# Patient Record
Sex: MALE | Race: BLACK OR AFRICAN AMERICAN | NOT HISPANIC OR LATINO | URBAN - METROPOLITAN AREA
[De-identification: names, ages, dates, MRNs, and addresses within clinical notes are randomized per-mention and may not be internally consistent; named-entity substitution may affect disease eponyms.]

---

## 2018-07-15 VITALS
HEIGHT: 73 IN | RESPIRATION RATE: 20 BRPM | HEART RATE: 89 BPM | TEMPERATURE: 98 F | OXYGEN SATURATION: 97 % | WEIGHT: 191.14 LBS | DIASTOLIC BLOOD PRESSURE: 61 MMHG | SYSTOLIC BLOOD PRESSURE: 113 MMHG

## 2018-07-15 LAB
ALBUMIN SERPL ELPH-MCNC: 4 G/DL — SIGNIFICANT CHANGE UP (ref 3.3–5)
ALP SERPL-CCNC: 41 U/L — SIGNIFICANT CHANGE UP (ref 40–120)
ALT FLD-CCNC: 35 U/L — SIGNIFICANT CHANGE UP (ref 10–45)
ANION GAP SERPL CALC-SCNC: 12 MMOL/L — SIGNIFICANT CHANGE UP (ref 5–17)
APTT BLD: 29.9 SEC — SIGNIFICANT CHANGE UP (ref 27.5–37.4)
AST SERPL-CCNC: 21 U/L — SIGNIFICANT CHANGE UP (ref 10–40)
BASOPHILS NFR BLD AUTO: 0.3 % — SIGNIFICANT CHANGE UP (ref 0–2)
BILIRUB SERPL-MCNC: 0.6 MG/DL — SIGNIFICANT CHANGE UP (ref 0.2–1.2)
BLD GP AB SCN SERPL QL: NEGATIVE — SIGNIFICANT CHANGE UP
BUN SERPL-MCNC: 18 MG/DL — SIGNIFICANT CHANGE UP (ref 7–23)
CALCIUM SERPL-MCNC: 9 MG/DL — SIGNIFICANT CHANGE UP (ref 8.4–10.5)
CHLORIDE SERPL-SCNC: 103 MMOL/L — SIGNIFICANT CHANGE UP (ref 96–108)
CO2 SERPL-SCNC: 32 MMOL/L — HIGH (ref 22–31)
CREAT SERPL-MCNC: 1.31 MG/DL — HIGH (ref 0.5–1.3)
EOSINOPHIL NFR BLD AUTO: 0.5 % — SIGNIFICANT CHANGE UP (ref 0–6)
GLUCOSE SERPL-MCNC: 92 MG/DL — SIGNIFICANT CHANGE UP (ref 70–99)
HCT VFR BLD CALC: 38.2 % — LOW (ref 39–50)
HGB BLD-MCNC: 12.2 G/DL — LOW (ref 13–17)
INR BLD: 1.05 — SIGNIFICANT CHANGE UP (ref 0.88–1.16)
LYMPHOCYTES # BLD AUTO: 12.5 % — LOW (ref 13–44)
MCHC RBC-ENTMCNC: 29.2 PG — SIGNIFICANT CHANGE UP (ref 27–34)
MCHC RBC-ENTMCNC: 31.9 G/DL — LOW (ref 32–36)
MCV RBC AUTO: 91.4 FL — SIGNIFICANT CHANGE UP (ref 80–100)
MONOCYTES NFR BLD AUTO: 11.4 % — SIGNIFICANT CHANGE UP (ref 2–14)
NEUTROPHILS NFR BLD AUTO: 75.3 % — SIGNIFICANT CHANGE UP (ref 43–77)
OB PNL STL: POSITIVE
PLATELET # BLD AUTO: 199 K/UL — SIGNIFICANT CHANGE UP (ref 150–400)
POTASSIUM SERPL-MCNC: 3.2 MMOL/L — LOW (ref 3.5–5.3)
POTASSIUM SERPL-SCNC: 3.2 MMOL/L — LOW (ref 3.5–5.3)
PROT SERPL-MCNC: 5.8 G/DL — LOW (ref 6–8.3)
PROTHROM AB SERPL-ACNC: 11.7 SEC — SIGNIFICANT CHANGE UP (ref 9.8–12.7)
RBC # BLD: 4.18 M/UL — LOW (ref 4.2–5.8)
RBC # FLD: 15.3 % — SIGNIFICANT CHANGE UP (ref 10.3–16.9)
RH IG SCN BLD-IMP: POSITIVE — SIGNIFICANT CHANGE UP
SODIUM SERPL-SCNC: 147 MMOL/L — HIGH (ref 135–145)
WBC # BLD: 6.6 K/UL — SIGNIFICANT CHANGE UP (ref 3.8–10.5)
WBC # FLD AUTO: 6.6 K/UL — SIGNIFICANT CHANGE UP (ref 3.8–10.5)

## 2018-07-15 PROCEDURE — 74174 CTA ABD&PLVS W/CONTRAST: CPT | Mod: 26

## 2018-07-15 RX ORDER — PANTOPRAZOLE SODIUM 20 MG/1
80 TABLET, DELAYED RELEASE ORAL ONCE
Qty: 0 | Refills: 0 | Status: COMPLETED | OUTPATIENT
Start: 2018-07-15 | End: 2018-07-15

## 2018-07-15 RX ORDER — SODIUM CHLORIDE 9 MG/ML
1000 INJECTION INTRAMUSCULAR; INTRAVENOUS; SUBCUTANEOUS ONCE
Qty: 0 | Refills: 0 | Status: COMPLETED | OUTPATIENT
Start: 2018-07-15 | End: 2018-07-15

## 2018-07-15 RX ADMIN — SODIUM CHLORIDE 1000 MILLILITER(S): 9 INJECTION INTRAMUSCULAR; INTRAVENOUS; SUBCUTANEOUS at 19:04

## 2018-07-15 RX ADMIN — PANTOPRAZOLE SODIUM 80 MILLIGRAM(S): 20 TABLET, DELAYED RELEASE ORAL at 18:58

## 2018-07-15 NOTE — ED PROVIDER NOTE - OBJECTIVE STATEMENT
73M with hx of sz, diverticulosis presenting with rectal bleeding 2 episodes yesterday. No abdominal pain, no hematemesis, no hx of abd surg. Pt with 4-5 episodes of rectal bleeding in last 3 days.

## 2018-07-15 NOTE — ED ADULT TRIAGE NOTE - CHIEF COMPLAINT QUOTE
Patient c/o rectal bleeding ( dark red ) since friday . Denies any abdominal pain , nausea nor vomiting . Not on thinner .

## 2018-07-15 NOTE — ED PROVIDER NOTE - PHYSICAL EXAMINATION
General: NAD, alert, conversant, comfortable-appearing  Head: ncat  Eyes: clear, pupils round  Throat: MMM, oropharynx clear  Neck: supple, no large masses, no meningismus  CV: RRR no murmurs  Resp: CTAB no w/c/r  Abd: nontender, no rebound/guarding  Rectal: gross blood per rectum  Back: no c-s ttp  Ext: no lower ext swelling  Neuro: alert and oriented x 3, strength equal in all 4 ext

## 2018-07-15 NOTE — ED ADULT NURSE NOTE - OBJECTIVE STATEMENT
Patient is a 74yo M, arrived to ED via walk-in, AAOx3, in NAD, vitals stable, complaining of two episodes of dark red bloodly stools since yesterday. Patient denies any CP, SOB, dizziness, blood thinners, abdominal pain, N/V/D, fevers, chills, cough or any other complaints at this time.  PIV placed, labs drawn and sent.  Will continue with plan of care.

## 2018-07-15 NOTE — ED PROVIDER NOTE - MEDICAL DECISION MAKING DETAILS
73M with rectal bleeding concern for diverticular bleed, hemodynamically stable, no abd ttp, Mild anemia, will obtain CTA to r/o rectal bleed. Pt given IV fluids. protonix for possible UGIB, doubt given no hematemesis, no elevated bun.

## 2018-07-15 NOTE — ED PROVIDER NOTE - PROGRESS NOTE DETAILS
discussed with radiology protocol for hematochezia, concern for diverticular bleed. given iv fluids for cr of 1.3. awaiting ct scan if neg for surgical cause of bleed will admit to gastroenterology given continued bleed CTA abdomen/pelvis; no diverticular bleed. Discussed with radiology resident who discussed with his attending.

## 2018-07-16 ENCOUNTER — INPATIENT (INPATIENT)
Facility: HOSPITAL | Age: 73
LOS: 3 days | Discharge: HOME | DRG: 378 | End: 2018-07-20
Attending: STUDENT IN AN ORGANIZED HEALTH CARE EDUCATION/TRAINING PROGRAM | Admitting: STUDENT IN AN ORGANIZED HEALTH CARE EDUCATION/TRAINING PROGRAM
Payer: MEDICARE

## 2018-07-16 DIAGNOSIS — I95.1 ORTHOSTATIC HYPOTENSION: ICD-10-CM

## 2018-07-16 DIAGNOSIS — E87.6 HYPOKALEMIA: ICD-10-CM

## 2018-07-16 DIAGNOSIS — D50.0 IRON DEFICIENCY ANEMIA SECONDARY TO BLOOD LOSS (CHRONIC): ICD-10-CM

## 2018-07-16 DIAGNOSIS — E87.0 HYPEROSMOLALITY AND HYPERNATREMIA: ICD-10-CM

## 2018-07-16 DIAGNOSIS — R56.9 UNSPECIFIED CONVULSIONS: ICD-10-CM

## 2018-07-16 DIAGNOSIS — N17.9 ACUTE KIDNEY FAILURE, UNSPECIFIED: ICD-10-CM

## 2018-07-16 DIAGNOSIS — Z29.9 ENCOUNTER FOR PROPHYLACTIC MEASURES, UNSPECIFIED: ICD-10-CM

## 2018-07-16 DIAGNOSIS — K92.2 GASTROINTESTINAL HEMORRHAGE, UNSPECIFIED: ICD-10-CM

## 2018-07-16 DIAGNOSIS — K57.90 DIVERTICULOSIS OF INTESTINE, PART UNSPECIFIED, WITHOUT PERFORATION OR ABSCESS WITHOUT BLEEDING: ICD-10-CM

## 2018-07-16 DIAGNOSIS — Z91.89 OTHER SPECIFIED PERSONAL RISK FACTORS, NOT ELSEWHERE CLASSIFIED: ICD-10-CM

## 2018-07-16 LAB
ANION GAP SERPL CALC-SCNC: 14 MMOL/L — SIGNIFICANT CHANGE UP (ref 5–17)
APPEARANCE UR: CLEAR — SIGNIFICANT CHANGE UP
BILIRUB UR-MCNC: NEGATIVE — SIGNIFICANT CHANGE UP
BLD GP AB SCN SERPL QL: NEGATIVE — SIGNIFICANT CHANGE UP
BUN SERPL-MCNC: 18 MG/DL — SIGNIFICANT CHANGE UP (ref 7–23)
CALCIUM SERPL-MCNC: 8.6 MG/DL — SIGNIFICANT CHANGE UP (ref 8.4–10.5)
CHLORIDE SERPL-SCNC: 105 MMOL/L — SIGNIFICANT CHANGE UP (ref 96–108)
CO2 SERPL-SCNC: 27 MMOL/L — SIGNIFICANT CHANGE UP (ref 22–31)
COLOR SPEC: YELLOW — SIGNIFICANT CHANGE UP
CREAT SERPL-MCNC: 1.12 MG/DL — SIGNIFICANT CHANGE UP (ref 0.5–1.3)
DIFF PNL FLD: NEGATIVE — SIGNIFICANT CHANGE UP
GLUCOSE SERPL-MCNC: 90 MG/DL — SIGNIFICANT CHANGE UP (ref 70–99)
GLUCOSE UR QL: NEGATIVE — SIGNIFICANT CHANGE UP
HCT VFR BLD CALC: 27.7 % — LOW (ref 39–50)
HCT VFR BLD CALC: 27.9 % — LOW (ref 39–50)
HCT VFR BLD CALC: 28.8 % — LOW (ref 39–50)
HCT VFR BLD CALC: 33.6 % — LOW (ref 39–50)
HCT VFR BLD CALC: 33.7 % — LOW (ref 39–50)
HGB BLD-MCNC: 10.6 G/DL — LOW (ref 13–17)
HGB BLD-MCNC: 10.6 G/DL — LOW (ref 13–17)
HGB BLD-MCNC: 8.7 G/DL — LOW (ref 13–17)
HGB BLD-MCNC: 8.8 G/DL — LOW (ref 13–17)
HGB BLD-MCNC: 9.1 G/DL — LOW (ref 13–17)
KETONES UR-MCNC: NEGATIVE — SIGNIFICANT CHANGE UP
LEUKOCYTE ESTERASE UR-ACNC: NEGATIVE — SIGNIFICANT CHANGE UP
MAGNESIUM SERPL-MCNC: 2 MG/DL — SIGNIFICANT CHANGE UP (ref 1.6–2.6)
MCHC RBC-ENTMCNC: 28.9 PG — SIGNIFICANT CHANGE UP (ref 27–34)
MCHC RBC-ENTMCNC: 29 PG — SIGNIFICANT CHANGE UP (ref 27–34)
MCHC RBC-ENTMCNC: 29.1 PG — SIGNIFICANT CHANGE UP (ref 27–34)
MCHC RBC-ENTMCNC: 31.4 G/DL — LOW (ref 32–36)
MCHC RBC-ENTMCNC: 31.5 G/DL — LOW (ref 32–36)
MCHC RBC-ENTMCNC: 31.6 G/DL — LOW (ref 32–36)
MCV RBC AUTO: 91.8 FL — SIGNIFICANT CHANGE UP (ref 80–100)
MCV RBC AUTO: 92 FL — SIGNIFICANT CHANGE UP (ref 80–100)
MCV RBC AUTO: 92 FL — SIGNIFICANT CHANGE UP (ref 80–100)
NITRITE UR-MCNC: NEGATIVE — SIGNIFICANT CHANGE UP
PH UR: 7.5 — SIGNIFICANT CHANGE UP (ref 5–8)
PLATELET # BLD AUTO: 150 K/UL — SIGNIFICANT CHANGE UP (ref 150–400)
PLATELET # BLD AUTO: 151 K/UL — SIGNIFICANT CHANGE UP (ref 150–400)
PLATELET # BLD AUTO: 167 K/UL — SIGNIFICANT CHANGE UP (ref 150–400)
PLATELET # BLD AUTO: 180 K/UL — SIGNIFICANT CHANGE UP (ref 150–400)
PLATELET # BLD AUTO: 190 K/UL — SIGNIFICANT CHANGE UP (ref 150–400)
POTASSIUM SERPL-MCNC: 3.4 MMOL/L — LOW (ref 3.5–5.3)
POTASSIUM SERPL-SCNC: 3.4 MMOL/L — LOW (ref 3.5–5.3)
PROT UR-MCNC: NEGATIVE MG/DL — SIGNIFICANT CHANGE UP
RBC # BLD: 3.01 M/UL — LOW (ref 4.2–5.8)
RBC # BLD: 3.04 M/UL — LOW (ref 4.2–5.8)
RBC # BLD: 3.13 M/UL — LOW (ref 4.2–5.8)
RBC # BLD: 3.66 M/UL — LOW (ref 4.2–5.8)
RBC # BLD: 3.67 M/UL — LOW (ref 4.2–5.8)
RBC # FLD: 14.9 % — SIGNIFICANT CHANGE UP (ref 10.3–16.9)
RBC # FLD: 15 % — SIGNIFICANT CHANGE UP (ref 10.3–16.9)
RBC # FLD: 15 % — SIGNIFICANT CHANGE UP (ref 10.3–16.9)
RBC # FLD: 15.3 % — SIGNIFICANT CHANGE UP (ref 10.3–16.9)
RBC # FLD: 15.5 % — SIGNIFICANT CHANGE UP (ref 10.3–16.9)
RH IG SCN BLD-IMP: POSITIVE — SIGNIFICANT CHANGE UP
SODIUM SERPL-SCNC: 146 MMOL/L — HIGH (ref 135–145)
SP GR SPEC: 1.01 — SIGNIFICANT CHANGE UP (ref 1–1.03)
UROBILINOGEN FLD QL: 0.2 E.U./DL — SIGNIFICANT CHANGE UP
WBC # BLD: 4.2 K/UL — SIGNIFICANT CHANGE UP (ref 3.8–10.5)
WBC # BLD: 4.9 K/UL — SIGNIFICANT CHANGE UP (ref 3.8–10.5)
WBC # BLD: 4.9 K/UL — SIGNIFICANT CHANGE UP (ref 3.8–10.5)
WBC # BLD: 5 K/UL — SIGNIFICANT CHANGE UP (ref 3.8–10.5)
WBC # BLD: 5.2 K/UL — SIGNIFICANT CHANGE UP (ref 3.8–10.5)
WBC # FLD AUTO: 4.2 K/UL — SIGNIFICANT CHANGE UP (ref 3.8–10.5)
WBC # FLD AUTO: 4.9 K/UL — SIGNIFICANT CHANGE UP (ref 3.8–10.5)
WBC # FLD AUTO: 4.9 K/UL — SIGNIFICANT CHANGE UP (ref 3.8–10.5)
WBC # FLD AUTO: 5 K/UL — SIGNIFICANT CHANGE UP (ref 3.8–10.5)
WBC # FLD AUTO: 5.2 K/UL — SIGNIFICANT CHANGE UP (ref 3.8–10.5)

## 2018-07-16 PROCEDURE — 99223 1ST HOSP IP/OBS HIGH 75: CPT | Mod: GC

## 2018-07-16 PROCEDURE — 12345: CPT | Mod: GC

## 2018-07-16 PROCEDURE — 99285 EMERGENCY DEPT VISIT HI MDM: CPT

## 2018-07-16 PROCEDURE — 99222 1ST HOSP IP/OBS MODERATE 55: CPT

## 2018-07-16 RX ORDER — ACETAMINOPHEN 500 MG
650 TABLET ORAL ONCE
Qty: 0 | Refills: 0 | Status: COMPLETED | OUTPATIENT
Start: 2018-07-16 | End: 2018-07-16

## 2018-07-16 RX ORDER — SOD SULF/SODIUM/NAHCO3/KCL/PEG
4000 SOLUTION, RECONSTITUTED, ORAL ORAL ONCE
Qty: 0 | Refills: 0 | Status: COMPLETED | OUTPATIENT
Start: 2018-07-16 | End: 2018-07-16

## 2018-07-16 RX ORDER — LEVETIRACETAM 250 MG/1
0 TABLET, FILM COATED ORAL
Qty: 0 | Refills: 0 | COMMUNITY

## 2018-07-16 RX ORDER — PANTOPRAZOLE SODIUM 20 MG/1
40 TABLET, DELAYED RELEASE ORAL
Qty: 0 | Refills: 0 | Status: DISCONTINUED | OUTPATIENT
Start: 2018-07-16 | End: 2018-07-19

## 2018-07-16 RX ORDER — POTASSIUM CHLORIDE 20 MEQ
40 PACKET (EA) ORAL ONCE
Qty: 0 | Refills: 0 | Status: COMPLETED | OUTPATIENT
Start: 2018-07-16 | End: 2018-07-16

## 2018-07-16 RX ORDER — SODIUM CHLORIDE 9 MG/ML
1000 INJECTION INTRAMUSCULAR; INTRAVENOUS; SUBCUTANEOUS ONCE
Qty: 0 | Refills: 0 | Status: COMPLETED | OUTPATIENT
Start: 2018-07-16 | End: 2018-07-16

## 2018-07-16 RX ORDER — SODIUM CHLORIDE 9 MG/ML
1000 INJECTION INTRAMUSCULAR; INTRAVENOUS; SUBCUTANEOUS
Qty: 0 | Refills: 0 | Status: DISCONTINUED | OUTPATIENT
Start: 2018-07-16 | End: 2018-07-18

## 2018-07-16 RX ORDER — LEVETIRACETAM 250 MG/1
1000 TABLET, FILM COATED ORAL
Qty: 0 | Refills: 0 | Status: DISCONTINUED | OUTPATIENT
Start: 2018-07-16 | End: 2018-07-20

## 2018-07-16 RX ADMIN — Medication 40 MILLIEQUIVALENT(S): at 11:32

## 2018-07-16 RX ADMIN — Medication 40 MILLIEQUIVALENT(S): at 04:26

## 2018-07-16 RX ADMIN — Medication 4000 MILLILITER(S): at 10:47

## 2018-07-16 RX ADMIN — PANTOPRAZOLE SODIUM 40 MILLIGRAM(S): 20 TABLET, DELAYED RELEASE ORAL at 07:37

## 2018-07-16 RX ADMIN — PANTOPRAZOLE SODIUM 40 MILLIGRAM(S): 20 TABLET, DELAYED RELEASE ORAL at 18:59

## 2018-07-16 RX ADMIN — SODIUM CHLORIDE 100 MILLILITER(S): 9 INJECTION INTRAMUSCULAR; INTRAVENOUS; SUBCUTANEOUS at 19:01

## 2018-07-16 RX ADMIN — LEVETIRACETAM 1000 MILLIGRAM(S): 250 TABLET, FILM COATED ORAL at 18:59

## 2018-07-16 RX ADMIN — SODIUM CHLORIDE 500 MILLILITER(S): 9 INJECTION INTRAMUSCULAR; INTRAVENOUS; SUBCUTANEOUS at 07:37

## 2018-07-16 RX ADMIN — LEVETIRACETAM 1000 MILLIGRAM(S): 250 TABLET, FILM COATED ORAL at 07:17

## 2018-07-16 NOTE — PROGRESS NOTE ADULT - PROBLEM SELECTOR PLAN 5
Patient with unknown baseline, obtain collateral  - F/u creatinine s/p 1L NS, may be prerenal in setting of GIB Patient with unknown baseline, obtain collateral  - F/u creatinine s/p 1L NS, may be prerenal in setting of GIB  - creatinine 1.12 (improving form 1.31 on admisison)  -c/w Cr trends

## 2018-07-16 NOTE — H&P ADULT - PROBLEM SELECTOR PLAN 4
- Patient reports 6 years of keppra usage due to one syncopal episode. Unclear etiology of syncope  - Obtain collateral, continue home med in interim Patient with unknown baseline, obtain collateral  - F/u creatinine s/p 1L NS, may be prerenal in setting of GIB - May represent cause of bleed  - Unlikely diverticulitis as patient with no fever or leukocytosis

## 2018-07-16 NOTE — H&P ADULT - PROBLEM SELECTOR PLAN 6
Ppx protonix, no SQH in setting of bleed    F: s/p 1L NS, 100cc/hr NS  E: replete K<4 Mg<2  N: NPO in setting of possible colonoscopy    Full code  Dispo RMF - Patient reports 6 years of keppra usage due to one syncopal episode. Unclear etiology of syncope  - Obtain collateral, continue home med in interim in setting of volume loss, on IVFs monitor BMP

## 2018-07-16 NOTE — H&P ADULT - PROBLEM SELECTOR PLAN 1
Patient with three episodes of bright red blood per rectum  - May be due to chronic steroid use vs alcohol abuse in past vs diverticulosis  - Rectal exam in ED positive for BRB  - Consult GI in AM  - F/u CBC and trend Hb  - No known cardiac history, Hb goal >8  - Type and screen  - Protonix 40mg IV BID pending likely colonoscopy Patient with three episodes of bright red blood per rectum  - May be due to chronic steroid use vs alcohol abuse in past vs diverticulosis. Obtain collateral on steroid use, patient reports due to joint pain but no history of OA or Rh arthritis  - Rectal exam in ED positive for BRB  - Consult GI in AM  - F/u CBC and trend Hb  - No known cardiac history, Hb goal >8  - Type and screen  - Protonix 40mg IV BID pending likely colonoscopy Patient with three episodes of bright red blood per rectum  - May be due to chronic steroid use vs alcohol abuse in past vs diverticulosis. Obtain collateral on steroid use, patient reports due to joint pain but no history of OA or Rh arthritis  - Rectal exam in ED positive for BRB  - Consult GI in AM; consult surgery  - F/u CBC and trend Hb  - No known cardiac history, Hb goal >8  - Type and screen  - Protonix 40mg IV BID pending likely colonoscopy

## 2018-07-16 NOTE — PROGRESS NOTE ADULT - PROBLEM SELECTOR PLAN 2
-may be the cause of current brbpr bleed  -will continue to monitor  -less likely diverticulitis as patient presented with no fever or leukocytosis

## 2018-07-16 NOTE — H&P ADULT - NSHPSOCIALHISTORY_GEN_ALL_CORE
Patient lives with his sister and nephew  Former smoker (20 pack year history)  Former alcohol abuse  No drug use

## 2018-07-16 NOTE — H&P ADULT - NSHPREVIEWOFSYSTEMS_GEN_ALL_CORE
REVIEW OF SYSTEMS:    CONSTITUTIONAL: No weakness, fevers or chills  EYES/ENT: No visual changes;  No vertigo or throat pain   NECK: No pain or stiffness  RESPIRATORY: No cough, wheezing, hemoptysis; No shortness of breath  CARDIOVASCULAR: No chest pain or palpitations  GASTROINTESTINAL: 3 episodes hematochezia as per HPI. No abdominal or epigastric pain. No nausea, vomiting, or hematemesis; No diarrhea or constipation. No melena.  GENITOURINARY: No dysuria, frequency or hematuria  NEUROLOGICAL: No numbness or weakness  SKIN: No itching, burning, rashes, or lesions   All other review of systems is negative unless indicated above.

## 2018-07-16 NOTE — CONSULT NOTE ADULT - SUBJECTIVE AND OBJECTIVE BOX
HPI: 73 year old M with PMH diverticulosis, chronic steroid  use for arthritis? seizure disorder who presented to the Ed with 3 day history of BRBPR . He states that on Friday he was on the train and went to the bathroom, noticed small bright red blood on his bowel movement. On Saturday, he had two more episodes with increased red blood in the bowel movement. He came to the ED on the following day. He denies nausea, emesis, chest pain, shortness of breath, abdominal pain, diarrhea, and constipation. He denies dizziness with standing/ lightheadedness/ fever/chills. He states that he feels otherwise well. Denies any NSAID use. Denies any similar episodes in the past.  Last EGD:  Last colonoscopy:    In ED VSS, Hb at presentation 12.2 with downtrend to 10.6 on repeat. Got 1L NS and protonix (16 Jul 2018 03:54)    Allergies    No Known Allergies    Intolerances      Home Medications:  Keppra 1000 mg oral tablet: 1 tab(s) orally 2 times a day (16 Jul 2018 04:57)  predniSONE 20 mg oral tablet: 1 tab(s) orally once a day (16 Jul 2018 04:57)    MEDICATIONS:  MEDICATIONS  (STANDING):  levETIRAcetam 1000 milliGRAM(s) Oral two times a day  pantoprazole  Injectable 40 milliGRAM(s) IV Push two times a day  potassium chloride    Tablet ER 40 milliEquivalent(s) Oral once  predniSONE   Tablet 20 milliGRAM(s) Oral daily  sodium chloride 0.9%. 1000 milliLiter(s) (100 mL/Hr) IV Continuous <Continuous>    MEDICATIONS  (PRN):    PAST MEDICAL & SURGICAL HISTORY:  Diverticulosis  No significant past surgical history    FAMILY HISTORY:  No pertinent family history in first degree relatives    SOCIAL HISTORY:  [place]: [ ] Current, [ ] Former, [ ] Never; Pack Years:  Alcohol:  Illicit Drugs:    REVIEW OF SYSTEMS:  CONSTITUTIONAL: No weakness, fevers or chills  HEENT: No visual changes; No vertigo or throat pain   NECK: No pain or stiffness  RESPIRATORY: No cough, wheezing, hemoptysis; No shortness of breath  CARDIOVASCULAR: No chest pain or palpitations  GASTROINTESTINAL: No abdominal or epigastric pain. No nausea, vomiting, or hematemesis; No diarrhea or constipation.   GENITOURINARY: No dysuria, frequency or hematuria  NEUROLOGICAL: No numbness or weakness  SKIN: No itching, burning, rashes, or lesions   All other 10 review of systems is negative unless indicated above.    Vital Signs Last 24 Hrs  T(C): 36.8 (16 Jul 2018 08:39), Max: 36.8 (15 Jul 2018 17:54)  T(F): 98.3 (16 Jul 2018 08:39), Max: 98.3 (15 Jul 2018 17:54)  HR: 73 (16 Jul 2018 08:39) (68 - 99)  BP: 122/76 (16 Jul 2018 08:39) (113/61 - 152/75)  BP(mean): --  RR: 18 (16 Jul 2018 08:39) (18 - 20)  SpO2: 98% (16 Jul 2018 08:39) (96% - 100%)    07-16 @ 07:01  -  07-16 @ 11:06  --------------------------------------------------------  IN: 300 mL / OUT: 0 mL / NET: 300 mL        PHYSICAL EXAM:    General: Well developed; well nourished; in no acute distress  Eyes: Anicteric sclerae, moist conjunctivae  HENT: Moist mucous membranes  Neck: Trachea midline, supple  Lungs: Normal respiratory effors and no intercostal retractions  Cardiovascular: RRR  Abdomen: Soft, non-tender non-distended; Normal bowel sounds; No rebound or guarding  Extremities: Normal range of motion, No clubbing, cyanosis or edema  Neurological: Alert and oriented x3  Skin: Warm and dry. No obvious rash  Rectal exam:    LABS:                        10.6   4.9   )-----------( 180      ( 16 Jul 2018 06:36 )             33.6     07-16    146<H>  |  105  |  18  ----------------------------<  90  3.4<L>   |  27  |  1.12    Ca    8.6      16 Jul 2018 06:36  Mg     2.0     07-16    TPro  5.8<L>  /  Alb  4.0  /  TBili  0.6  /  DBili  x   /  AST  21  /  ALT  35  /  AlkPhos  41  07-15        PT/INR - ( 15 Jul 2018 18:21 )   PT: 11.7 sec;   INR: 1.05          PTT - ( 15 Jul 2018 18:21 )  PTT:29.9 sec    RADIOLOGY & ADDITIONAL STUDIES: HPI: 73 year old M with PMH diverticulosis, chronic steroid use for arthritis? seizure disorder who presented to the Ed with 3 day history of BRBPR . He states that on Friday he was on the train and went to the bathroom, noticed small bright red blood on his bowel movement. On Saturday, he had two more episodes with increased red blood mixed with stool. He came to the ED on the following day. He denies nausea/vomiting/chest pain/shortness of breath/abdominal pain/diarrhea/constipation/heartburn. He denies dizziness with standing/ lightheadedness/ fever/chills. He states that he feels otherwise well. Denies any NSAID use. Denies any similar episodes in the past. Denies any recent change in weight/ appetite.  Last EGD: per patient 3 years ago, was told he had 'cuts' inside his stomach  Last colonoscopy: 10 years ago in [place], was told he has diverticulosis    In ED VSS, Hb at presentation 12.2 with downtrend to 10.6 on repeat. Got 1L NS and protonix (16 Jul 2018 03:54)    Allergies    No Known Allergies    Intolerances      Home Medications:  Keppra 1000 mg oral tablet: 1 tab(s) orally 2 times a day (16 Jul 2018 04:57)  predniSONE 20 mg oral tablet: 1 tab(s) orally once a day (16 Jul 2018 04:57)    MEDICATIONS:  MEDICATIONS  (STANDING):  levETIRAcetam 1000 milliGRAM(s) Oral two times a day  pantoprazole  Injectable 40 milliGRAM(s) IV Push two times a day  potassium chloride    Tablet ER 40 milliEquivalent(s) Oral once  predniSONE   Tablet 20 milliGRAM(s) Oral daily  sodium chloride 0.9%. 1000 milliLiter(s) (100 mL/Hr) IV Continuous <Continuous>    MEDICATIONS  (PRN):    PAST MEDICAL & SURGICAL HISTORY:  Diverticulosis  No significant past surgical history    FAMILY HISTORY:  No pertinent family history in first degree relatives    SOCIAL HISTORY:  [place]: [ ] Current, [ ] Former, [ ] Never; Pack Years:  Alcohol:  Illicit Drugs:    REVIEW OF SYSTEMS:  CONSTITUTIONAL: No weakness, fevers or chills  HEENT: No visual changes; No vertigo or throat pain   NECK: No pain or stiffness  RESPIRATORY: No cough, wheezing, hemoptysis; No shortness of breath  CARDIOVASCULAR: No chest pain or palpitations  GASTROINTESTINAL: No abdominal or epigastric pain. No nausea, vomiting, or hematemesis; No diarrhea or constipation.   GENITOURINARY: No dysuria, frequency or hematuria  NEUROLOGICAL: No numbness or weakness  SKIN: No itching, burning, rashes, or lesions   All other 10 review of systems is negative unless indicated above.    Vital Signs Last 24 Hrs  T(C): 36.8 (16 Jul 2018 08:39), Max: 36.8 (15 Jul 2018 17:54)  T(F): 98.3 (16 Jul 2018 08:39), Max: 98.3 (15 Jul 2018 17:54)  HR: 73 (16 Jul 2018 08:39) (68 - 99)  BP: 122/76 (16 Jul 2018 08:39) (113/61 - 152/75)  BP(mean): --  RR: 18 (16 Jul 2018 08:39) (18 - 20)  SpO2: 98% (16 Jul 2018 08:39) (96% - 100%)    07-16 @ 07:01  -  07-16 @ 11:06  --------------------------------------------------------  IN: 300 mL / OUT: 0 mL / NET: 300 mL        PHYSICAL EXAM:    General: Well developed; well nourished; in no acute distress  Eyes: Anicteric sclerae, moist conjunctivae  HENT: Moist mucous membranes  Neck: Trachea midline, supple  Lungs: Normal respiratory effors and no intercostal retractions  Cardiovascular: RRR  Abdomen: Soft, non-tender non-distended; Normal bowel sounds; No rebound or guarding  Extremities: Normal range of motion, No clubbing, cyanosis or edema  Neurological: Alert and oriented x3  Skin: Warm and dry. No obvious rash  Rectal exam: vault empty, minimal pink/red colored fluid on withdrawal     LABS:                        10.6   4.9   )-----------( 180      ( 16 Jul 2018 06:36 )             33.6     07-16    146<H>  |  105  |  18  ----------------------------<  90  3.4<L>   |  27  |  1.12    Ca    8.6      16 Jul 2018 06:36  Mg     2.0     07-16    TPro  5.8<L>  /  Alb  4.0  /  TBili  0.6  /  DBili  x   /  AST  21  /  ALT  35  /  AlkPhos  41  07-15        PT/INR - ( 15 Jul 2018 18:21 )   PT: 11.7 sec;   INR: 1.05          PTT - ( 15 Jul 2018 18:21 )  PTT:29.9 sec    RADIOLOGY & ADDITIONAL STUDIES:

## 2018-07-16 NOTE — CONSULT NOTE ADULT - SUBJECTIVE AND OBJECTIVE BOX
HPI:  Patient is a 73 year old M with PMH diverticulosis who presents with 3 days of hematochezia. He states that on Friday he was on the train and went to the bathroom, noticed small bright red blood on his bowel movement. On Saturday, he had two episodes of hematochezia with increased red blood in the bowel movement. He came to the ED on the following day. He denies nausea, emesis, chest pain, shortness of breath, abdominal pain, diarrhea, and constipation. He denies dizziness with standing, feeling currently lightheaded, and fever/chills. He states that he feels otherwise well.     In ED VSS, Hb at presentation 12.2 with downtrend to 10.6 on repeat. Got 1L NS and protonix (2018 03:54)    Surgery was consulted for further evaluation. He has previous episodes intermittently of small blood AL. Last c-scope was 1 year ago and showed diverticulosis. He does not recall having EGD, however states history of "cuts in his stomach." This is the first admission for bleeding. No personal or family Hx of UC, Crohn's Dx, gi malignancy.   Of note, he has been on prednisone for >6 years for "pain" and takes Keppra for previous fainting episodes. Never been seen by Neurologist nor had formal seizure work up. Currently he denies active bleeding, the last episode BRBPR was last night. BM this morning contained some maroon blood and clots. Denies SOB, CP, N/V, F/C, dysuria, cough.     PAST MEDICAL & SURGICAL HISTORY:  Diverticulosis  No significant past surgical history      REVIEW OF SYSTEMS      Pertinent finding discussed above.     MEDICATIONS  (STANDING):  levETIRAcetam 1000 milliGRAM(s) Oral two times a day  pantoprazole  Injectable 40 milliGRAM(s) IV Push two times a day  polyethylene glycol/electrolyte Solution. 4000 milliLiter(s) Oral once  predniSONE   Tablet 20 milliGRAM(s) Oral daily  sodium chloride 0.9%. 1000 milliLiter(s) (100 mL/Hr) IV Continuous <Continuous>    MEDICATIONS  (PRN):      Allergies    No Known Allergies    Intolerances        SOCIAL HISTORY:    FAMILY HISTORY:  No pertinent family history in first degree relatives      Vital Signs Last 24 Hrs  T(C): 36.8 (2018 08:39), Max: 36.8 (15 Jul 2018 17:54)  T(F): 98.3 (2018 08:39), Max: 98.3 (15 Jul 2018 17:54)  HR: 73 (2018 08:39) (68 - 99)  BP: 122/76 (2018 08:39) (113/61 - 152/75)  BP(mean): --  RR: 18 (2018 08:39) (18 - 20)  SpO2: 98% (2018 08:39) (96% - 100%)    PHYSICAL EXAM:    General: a/o x4 NAD  ABD: soft, NT, ND  Rectal: no obvious hemorrhoid, vault intact maroon blood to gloved finger         LABS:                        10.6   4.9   )-----------( 180      ( 2018 06:36 )             33.6     07-16    146<H>  |  105  |  18  ----------------------------<  90  3.4<L>   |  27  |  1.12    Ca    8.6      2018 06:36  Mg     2.0     07-16    TPro  5.8<L>  /  Alb  4.0  /  TBili  0.6  /  DBili  x   /  AST  21  /  ALT  35  /  AlkPhos  41  07-15    PT/INR - ( 15 Jul 2018 18:21 )   PT: 11.7 sec;   INR: 1.05          PTT - ( 15 Jul 2018 18:21 )  PTT:29.9 sec  Urinalysis Basic - ( 2018 01:05 )    Color: Yellow / Appearance: Clear / S.015 / pH: x  Gluc: x / Ketone: NEGATIVE  / Bili: Negative / Urobili: 0.2 E.U./dL   Blood: x / Protein: NEGATIVE mg/dL / Nitrite: NEGATIVE   Leuk Esterase: NEGATIVE / RBC: x / WBC x   Sq Epi: x / Non Sq Epi: x / Bacteria: x        RADIOLOGY & ADDITIONAL STUDIES:  < from: CT Angio Abdomen and Pelvis w/ IV Cont (07.15.18 @ 23:08) >  FINDINGS:    Arterial and delayed sequences do not demonstrate any evidence of active   contrast extravasation to suggest active GI hemorrhage at the time of   this study. There is extensive colonic diverticulosis. No evidence of   bowel obstruction, intraperitoneal free air or ascites. Normal caliber   appendix. Moderate stool throughout the entire large bowel.    Large bilateral renal cysts measuring up to 9.5 cm at the left lower pole   and 5.9 cm at the right lower pole. Symmetric bilateral renal   enhancement. No hydroureteronephrosis.    The liver, gallbladder, spleen, pancreas and bilateral adrenal glands are   grossly unremarkable. Low attenuating left hepatic lesion measuring   approximately 1.1 cm may represent a cyst or hemangioma.    Aneurysmal dilatation of the infrarenal abdominal aorta, just proximal to   the bifurcation measuring 3.2 x 3.2 cm. There is also aneurysmal   dilatation of the bilateral common iliac arteries and external iliac   arteries. The aorta and aortic branches are opacified with contrast.    No bulky retroperitoneal lymphadenopathy.     Small fat-containing left inguinal hernia.    Degenerative changes at L4-5 with loss of intervertebral disc height and   associated endplate sclerosis. Minimal retrolisthesis of L4 on L5.    IMPRESSION:  No contrast extravasation to suggest active GI hemorrhage at the time of   this study.  Other findings as above.    < end of copied text >

## 2018-07-16 NOTE — PROGRESS NOTE ADULT - SUBJECTIVE AND OBJECTIVE BOX
INTERVAL HPI/OVERNIGHT EVENTS:  Patient was seen and examined at bedside. Pt said he had 2 more BRBPR this morning.  Otherwise pt does not complain of any dizziness, SOB, chest pain, palpitations, nausea, vomiting.     VITAL SIGNS:  T(F): 98.3 (18 @ 08:39)  HR: 73 (18 @ 08:39)  BP: 122/76 (18 @ 08:39)  RR: 18 (18 @ 08:39)  SpO2: 98% (18 @ 08:39)  Wt(kg): --    PHYSICAL EXAM:    Constitutional:  male, pleasant, NAD  HEENT: sclera nonicteric, tongue + for nonscrapable white surface  Respiratory: CTAB  Cardiovascular: rrr, normal s1s2, no m/r/g  Gastrointestinal: soft, NTND, non distended, normoactive bowel sounds, nontympanic  Extremities: Warm, well perfused, +1 pitting edema ankles bilaterally  Neurological: aa0x3  Skin: warm, dry, cap refill + 2    MEDICATIONS  (STANDING):  levETIRAcetam 1000 milliGRAM(s) Oral two times a day  pantoprazole  Injectable 40 milliGRAM(s) IV Push two times a day  predniSONE   Tablet 20 milliGRAM(s) Oral daily  sodium chloride 0.9%. 1000 milliLiter(s) (100 mL/Hr) IV Continuous <Continuous>    MEDICATIONS  (PRN):      Allergies    No Known Allergies    Intolerances        LABS:                        10.6   4.9   )-----------( 180      ( 2018 06:36 )             33.6     07-16    146<H>  |  105  |  18  ----------------------------<  90  3.4<L>   |  27  |  1.12    Ca    8.6      2018 06:36  Mg     2.0     -16    TPro  5.8<L>  /  Alb  4.0  /  TBili  0.6  /  DBili  x   /  AST  21  /  ALT  35  /  AlkPhos  41  07-15    PT/INR - ( 15 Jul 2018 18:21 )   PT: 11.7 sec;   INR: 1.05          PTT - ( 15 Jul 2018 18:21 )  PTT:29.9 sec  Urinalysis Basic - ( 2018 01:05 )    Color: Yellow / Appearance: Clear / S.015 / pH: x  Gluc: x / Ketone: NEGATIVE  / Bili: Negative / Urobili: 0.2 E.U./dL   Blood: x / Protein: NEGATIVE mg/dL / Nitrite: NEGATIVE   Leuk Esterase: NEGATIVE / RBC: x / WBC x   Sq Epi: x / Non Sq Epi: x / Bacteria: x        RADIOLOGY & ADDITIONAL TESTS:  Reviewed

## 2018-07-16 NOTE — H&P ADULT - PROBLEM SELECTOR PLAN 5
Hypokalemic to 3.2 on presentation  - Replete and monitor BMP Patient with unknown baseline, obtain collateral  - F/u creatinine s/p 1L NS, may be prerenal in setting of GIB

## 2018-07-16 NOTE — H&P ADULT - HISTORY OF PRESENT ILLNESS
Patient is a 73 year old M with PMH diverticulosis who presents with 3 days of hematochezia. He states that on Friday he was on the train and went to the bathroom, noticed small bright red blood on his bowel movement. The following day, he had two episodes of he Patient is a 73 year old M with PMH diverticulosis who presents with 3 days of hematochezia. He states that on Friday he was on the train and went to the bathroom, noticed small bright red blood on his bowel movement. On Saturday, he had two episodes of hematochezia with increased red blood in the bowel movement. He came to the ED on the following day. He denies nausea, emesis, chest pain, shortness of breath, abdominal pain, diarrhea, and constipation. He denies dizziness with standing, feeling currently lightheaded, and fever/chills. He states that he feels otherwise well.     In ED VSS, Hb at presentation 12.2 with downtrend to 10.6 on repeat. Got 1L NS and protonix

## 2018-07-16 NOTE — H&P ADULT - NSHPPHYSICALEXAM_GEN_ALL_CORE
.  VITAL SIGNS:  T(C): 36.7 (07-16-18 @ 04:22), Max: 36.8 (07-15-18 @ 17:54)  T(F): 98.1 (07-16-18 @ 04:22), Max: 98.3 (07-15-18 @ 17:54)  HR: 79 (07-16-18 @ 04:22) (76 - 89)  BP: 129/79 (07-16-18 @ 04:22) (113/61 - 133/81)  BP(mean): --  RR: 19 (07-16-18 @ 04:22) (19 - 20)  SpO2: 98% (07-16-18 @ 04:22) (96% - 98%)  Wt(kg): --    PHYSICAL EXAM:    Constitutional: WDWN resting comfortably in bed; NAD  Head: NC/AT  Eyes: PERRL, EOMI, anicteric sclera  ENT: no nasal discharge, MMM  Neck: supple; no JVD appreciated  Respiratory: CTA B/L; no W/R/R, no increased work of breathing  Cardiac: +S1/S2; regular rate  Gastrointestinal: soft, NT/ND; no rebound or guarding; +BSx4  Extremities: WWP, no cyanosis; 1-2+ b/l LE edema  Musculoskeletal: NROM x4; no joint swelling, tenderness or erythema  Vascular: 2+ radial, DP pulses B/L  Dermatologic: skin warm, dry and intact  Neurologic: Alert and oriented, no focal deficits appreciated  Psychiatric: affect and characteristics of appearance, verbalizations, behaviors are appropriate
Alert-The patient is alert, awake and responds to voice. The patient is oriented to time, place, and person. The triage nurse is able to obtain subjective information.

## 2018-07-16 NOTE — PROGRESS NOTE ADULT - PROBLEM SELECTOR PLAN 8
- Patient reports 6 years of keppra usage due to one syncopal episode. Unclear etiology of syncope  -c/w keppra 1000mg BID for seizure prophylaxis  -will call PCP regarding this home medicaiton - Patient reports 6 years of keppra due to one syncopal episode.   - called PCP, still unclear etiology of syncope  -c/w keppra 1000mg BID for seizure prophylaxis  -will call PCP regarding this home medicaiton

## 2018-07-16 NOTE — H&P ADULT - PROBLEM SELECTOR PLAN 3
Patient with unknown baseline, obtain collateral  - F/u creatinine s/p 1L NS, may be prerenal in setting of GIB - May represent cause of bleed  - Unlikely diverticulitis as patient with no fever or leukocytosis monitor cbc, check iron studies

## 2018-07-16 NOTE — CONSULT NOTE ADULT - ASSESSMENT
Assessment and plan: 73 year old M with PMH diverticulosis who presents with 3 days of hematochezia. Hb downtrended from 12.2 to 10.6,vitals stable. Rectal exam....  Possible etiologies could be diverticulosis/ hemorrhoids. No significant risk factors for ischemia/infectious etiology.  -trend h/h  -monitor vitals, monitor for further signs of bleeding  - Assessment and plan: 73 year old M with PMH diverticulosis who presents with 3 days of hematochezia. Hb downtrended from 12.2 to 10.6,vitals stable. Rectal exam....  Possible etiologies could be diverticulosis/ hemorrhoids. No significant risk factors for ischemia/infectious etiology.   -trend h/h   -monitor vitals, monitor for further signs of bleeding  - Assessment and plan: 73 year old M with PMH diverticulosis who presents with 3 days of hematochezia. Hb downtrended from 12.2 to 10.6,vitals stable. Rectal exam....  Possible etiologies could be diverticulosis/ hemorrhoids. No significant risk factors for ischemia/infectious etiology. Although patient complaining of BRBPR and on our rectal exam we didnot see any significant blood, the rectal exam per surgery and primary team did show dark blood/ maroon stool/melena? He also has a history of gastric ulcer?  -trend h/h   -monitor vitals, monitor for further signs of bleeding  -goal transfusion hb<7  -will plan for EGD and colonoscopy tomorrow   -patient already on the prep  -clears today  -npo after midnite      updated team

## 2018-07-16 NOTE — PROGRESS NOTE ADULT - PROBLEM SELECTOR PLAN 10
1) PCP reported as Dr. Mcneill on [facility], with possible number    Contacted on Admission: not contacted on overnight admission  2) Date of Contact with PCP:  3) PCP Contacted at Discharge: (Y/N)  4) Summary of Handoff Given to PCP:   5) Post-Discharge Appointment Date and Location: 1) PCP reported as Dr. Mcneill on [facility], with possible number    Contacted on Admission: not contacted on overnight admission  2) Date of Contact with PCP: 7/16 called,  answered, will call again @ 2pm today  3) PCP Contacted at Discharge: (Y/N)  4) Summary of Handoff Given to PCP:   5) Post-Discharge Appointment Date and Location: 1) PCP: Dr. Mcneill on [facility], [phone]   Contacted on Admission: not contacted on overnight admission  2) Date of Contact with PCP: 7/16 called,  answered, will call again @ 2pm today  3) PCP Contacted at Discharge: (Y/N)  4) Summary of Handoff Given to PCP:   5) Post-Discharge Appointment Date and Location:

## 2018-07-16 NOTE — CONSULT NOTE ADULT - ASSESSMENT
73M with know diverticulosis presented with 3 days BRBPR, CT angio showed extensive colonic diverticulosis with no blush HGB 12.2, dropped to 10.6 and was stable on rpt, last BRBPR was last night and is currently having maroon stool.     Recs:  -Consult GI for GI bleed, likely diverticular, however with questionable history should consider EGD as well.   -Contact PCP as to necessity of continuing Prednisone, Keppra  -Monitor CBC q8  -Monitor for return of acute bleed  -Please notify Surgery if bleeding acutely or drop in Hgb  -Will follow closely  -Plan discussed with Chief resident and Dr Garcia

## 2018-07-16 NOTE — H&P ADULT - PROBLEM SELECTOR PLAN 7
1) PCP reported as Dr. Mcneill on [facility], with possible number    Contacted on Admission: not contacted on overnight admission  2) Date of Contact with PCP:  3) PCP Contacted at Discharge: (Y/N)  4) Summary of Handoff Given to PCP:   5) Post-Discharge Appointment Date and Location: Ppx protonix, no SQH in setting of bleed    F: s/p 1L NS, 100cc/hr NS  E: replete K<4 Mg<2  N: NPO in setting of possible colonoscopy    Full code  Dispo RMF Hypokalemic to 3.2 on presentation  - Replete and monitor BMP

## 2018-07-16 NOTE — H&P ADULT - NSHPLABSRESULTS_GEN_ALL_CORE
.  LABS:                         10.6   5.2   )-----------( 190      ( 2018 02:49 )             33.7     07-15    147<H>  |  103  |  18  ----------------------------<  92  3.2<L>   |  32<H>  |  1.31<H>    Ca    9.0      15 Jul 2018 18:21    TPro  5.8<L>  /  Alb  4.0  /  TBili  0.6  /  DBili  x   /  AST  21  /  ALT  35  /  AlkPhos  41  07-15    PT/INR - ( 15 Jul 2018 18:21 )   PT: 11.7 sec;   INR: 1.05          PTT - ( 15 Jul 2018 18:21 )  PTT:29.9 sec  Urinalysis Basic - ( 2018 01:05 )    Color: Yellow / Appearance: Clear / S.015 / pH: x  Gluc: x / Ketone: NEGATIVE  / Bili: Negative / Urobili: 0.2 E.U./dL   Blood: x / Protein: NEGATIVE mg/dL / Nitrite: NEGATIVE   Leuk Esterase: NEGATIVE / RBC: x / WBC x   Sq Epi: x / Non Sq Epi: x / Bacteria: x                RADIOLOGY, EKG & ADDITIONAL TESTS: Reviewed.

## 2018-07-16 NOTE — H&P ADULT - PROBLEM SELECTOR PLAN 9
Ppx protonix, no SQH in setting of bleed    F: s/p 1L NS, 100cc/hr NS  E: replete K<4 Mg<2  N: NPO in setting of possible colonoscopy    Full code  Dispo RMF

## 2018-07-16 NOTE — H&P ADULT - ATTENDING COMMENTS
patient seen and examined    reviewed vs, labs, available radiological reports/ studies, ekg     agree w/ PE findings as above     1. lower GI bleed/ orthostatic hypotension: monitor cbc, consult surgery, followup repeat orthostatic s/p 1 L bolus.   2. monitor renal function, lytes  3. clarify home medications and reason why pt is on prednisone and keppra      rest of plan as above

## 2018-07-16 NOTE — PROGRESS NOTE ADULT - PROBLEM SELECTOR PLAN 1
Differentials include diverticulosis bleed, angiodysplasia, rectal varices, hemorrhoids, cancer.    - May be due to chronic steroid use vs alcohol abuse in past vs diverticulosis. Obtain collateral on steroid use, patient reports due to joint pain but no history of OA or Rh arthritis  - GI made aware this morning, pt will be kept NPO, begin jessika, plan for EGD and colonscopy today  - Most recent Hgb 10.6, unchanged since morning, Hgb 12 on admission  - No known cardiac history, if need to transfuse, keep Hb goal >8  - 2 x Type and screen recieved  - c/w Protonix 40mg IV BID   - c/w cbc q 6 hrs  - c/w Hgb trend, hemodynamic instability Differentials include diverticulosis bleed, angiodysplasia, rectal varices, hemorrhoids, cancer.    - May be due to chronic steroid use vs alcohol abuse in past vs diverticulosis. - pt on predisone 20mg po due to arthritis as per outpatient PCP  - GI made aware this morning, pt will be kept NPO, begin jessika, plan for EGD and colonscopy today  - Most recent Hgb 10.6, unchanged since morning, Hgb 12 on admission  - No known cardiac history, if need to transfuse, keep Hb goal >8  - 2 x Type and screen recieved  - c/w Protonix 40mg IV BID   - c/w cbc q 6 hrs  - c/w Hgb trend, hemodynamic instability

## 2018-07-16 NOTE — H&P ADULT - PROBLEM SELECTOR PLAN 10
1) PCP reported as Dr. Mcneill on [facility], with possible number    Contacted on Admission: not contacted on overnight admission  2) Date of Contact with PCP:  3) PCP Contacted at Discharge: (Y/N)  4) Summary of Handoff Given to PCP:   5) Post-Discharge Appointment Date and Location:

## 2018-07-16 NOTE — H&P ADULT - PROBLEM SELECTOR PLAN 2
- May represent cause of bleed  - Unlikely diverticulitis as patient with no fever or leukocytosis found to be orthostatic on RMF but asymptomatic; receiving 1L bolus will recheck orthostatics, may need telemetry if persisting and/or symptomatic

## 2018-07-16 NOTE — PROGRESS NOTE ADULT - ASSESSMENT
73 year old M with PMH diverticulosis presented with 3 days of BRBPR, admitted for investigation for hematochezia. Currently hemodynamically stable, Hb 12.2 on admission which decreased to 10.6 hgb.

## 2018-07-16 NOTE — CONSULT NOTE ADULT - ATTENDING COMMENTS
Patient seen and examined with surgical housestaff. Clinically appears to have stopped bleeding. Agree with endoscopy/colonscopy plan to evaluate for potential source.
73 year old M with PMH diverticulosis and ?PUD presents with 3 days of hematochezia will plan for EGD/cscope to evaluate source. Continue to trend hgb, PPI.

## 2018-07-17 ENCOUNTER — RESULT REVIEW (OUTPATIENT)
Age: 73
End: 2018-07-17

## 2018-07-17 LAB
HCT VFR BLD CALC: 24.5 % — LOW (ref 39–50)
HGB BLD-MCNC: 7.7 G/DL — LOW (ref 13–17)
MCHC RBC-ENTMCNC: 29.1 PG — SIGNIFICANT CHANGE UP (ref 27–34)
MCHC RBC-ENTMCNC: 31.4 G/DL — LOW (ref 32–36)
MCV RBC AUTO: 92.5 FL — SIGNIFICANT CHANGE UP (ref 80–100)
PLATELET # BLD AUTO: 151 K/UL — SIGNIFICANT CHANGE UP (ref 150–400)
RBC # BLD: 2.65 M/UL — LOW (ref 4.2–5.8)
RBC # FLD: 14.9 % — SIGNIFICANT CHANGE UP (ref 10.3–16.9)
WBC # BLD: 4.7 K/UL — SIGNIFICANT CHANGE UP (ref 3.8–10.5)
WBC # FLD AUTO: 4.7 K/UL — SIGNIFICANT CHANGE UP (ref 3.8–10.5)

## 2018-07-17 PROCEDURE — 99233 SBSQ HOSP IP/OBS HIGH 50: CPT | Mod: GC

## 2018-07-17 RX ADMIN — Medication 650 MILLIGRAM(S): at 00:06

## 2018-07-17 RX ADMIN — PANTOPRAZOLE SODIUM 40 MILLIGRAM(S): 20 TABLET, DELAYED RELEASE ORAL at 19:35

## 2018-07-17 RX ADMIN — PANTOPRAZOLE SODIUM 40 MILLIGRAM(S): 20 TABLET, DELAYED RELEASE ORAL at 06:40

## 2018-07-17 RX ADMIN — SODIUM CHLORIDE 100 MILLILITER(S): 9 INJECTION INTRAMUSCULAR; INTRAVENOUS; SUBCUTANEOUS at 03:02

## 2018-07-17 RX ADMIN — SODIUM CHLORIDE 100 MILLILITER(S): 9 INJECTION INTRAMUSCULAR; INTRAVENOUS; SUBCUTANEOUS at 11:54

## 2018-07-17 RX ADMIN — LEVETIRACETAM 1000 MILLIGRAM(S): 250 TABLET, FILM COATED ORAL at 19:35

## 2018-07-17 RX ADMIN — LEVETIRACETAM 1000 MILLIGRAM(S): 250 TABLET, FILM COATED ORAL at 06:40

## 2018-07-17 RX ADMIN — SODIUM CHLORIDE 100 MILLILITER(S): 9 INJECTION INTRAMUSCULAR; INTRAVENOUS; SUBCUTANEOUS at 19:48

## 2018-07-17 RX ADMIN — Medication 650 MILLIGRAM(S): at 00:30

## 2018-07-17 NOTE — PROGRESS NOTE ADULT - PROBLEM SELECTOR PLAN 5
(resolved)  - F/u creatinine s/p 1L NS, may be prerenal in setting of GIB  - creatinine  baseline 1.12  (1.31 on admisison)  -c/w Cr trends

## 2018-07-17 NOTE — PROGRESS NOTE ADULT - PROBLEM SELECTOR PLAN 1
Differentials include diverticulosis bleed, angiodysplasia, rectal varices, hemorrhoids, cancer.    - May be due to chronic steroid use vs alcohol abuse in past vs diverticulosis. - pt on predisone 20mg po due to arthritis as per outpatient PCP  - finished jessika prep. will receive EGD and colonoscopy this morning  - Most recent Hgb 8.7<- 9.1<- Hgb 12 on admission  - No known cardiac history, if need to transfuse, keep Hb goal >8  - 2 x Type and screen received  - c/w Protonix 40mg IV BID   - c/w cbc q 6 hrs  - c/w Hgb trend, hemodynamic instability

## 2018-07-17 NOTE — PROGRESS NOTE ADULT - SUBJECTIVE AND OBJECTIVE BOX
INTERVAL HPI/OVERNIGHT EVENTS:  EDELMIRA overnight. Pt says he slept overnight, and attempted to finish the rest of the bowel jessika. He states he had about 2 bloody stools yesterday and feels a little more tired. He has not eaten since Saturday. Denies dizziness, palpitations, fever, weakness HA, nausea, vomiting, dysuria.     VITAL SIGNS:  T(F): 98.8 (18 @ 05:44)  HR: 63 (18 @ 05:44)  BP: 120/73 (18 @ 05:44)  RR: 18 (18 @ 05:44)  SpO2: 100% (18 @ 05:44)  Wt(kg): --    PHYSICAL EXAM:    Constitutional: WDWN, NAD  HEENT: PERRL, EOMI, sclera non-icteric, neck supple, trachea midline, no masses, no JVD, MMM, good dentition  Respiratory: CTA b/l, good air entry b/l, no wheezing, no rhonchi, no rales, without accessory muscle use and no intercostal retractions  Cardiovascular: RRR, normal S1S2, no M/R/G  Gastrointestinal: soft, NTND, no masses palpable, BS normal  Extremities: Warm, well perfused, pulses equal bilateral upper and lower extremities, no edema, no clubbing  Neurological: AAOx3, CN Grossly intact  Skin: Normal temperature, warm, dry    MEDICATIONS  (STANDING):  levETIRAcetam 1000 milliGRAM(s) Oral two times a day  pantoprazole  Injectable 40 milliGRAM(s) IV Push two times a day  predniSONE   Tablet 20 milliGRAM(s) Oral daily  sodium chloride 0.9%. 1000 milliLiter(s) (100 mL/Hr) IV Continuous <Continuous>    MEDICATIONS  (PRN):      Allergies    No Known Allergies    Intolerances        LABS:                        8.7    4.9   )-----------( 167      ( 2018 23:05 )             27.7     07-16    146<H>  |  105  |  18  ----------------------------<  90  3.4<L>   |  27  |  1.12    Ca    8.6      2018 06:36  Mg     2.0     07-16    TPro  5.8<L>  /  Alb  4.0  /  TBili  0.6  /  DBili  x   /  AST  21  /  ALT  35  /  AlkPhos  41  07-15    PT/INR - ( 15 Jul 2018 18:21 )   PT: 11.7 sec;   INR: 1.05          PTT - ( 15 Jul 2018 18:21 )  PTT:29.9 sec  Urinalysis Basic - ( 2018 01:05 )    Color: Yellow / Appearance: Clear / S.015 / pH: x  Gluc: x / Ketone: NEGATIVE  / Bili: Negative / Urobili: 0.2 E.U./dL   Blood: x / Protein: NEGATIVE mg/dL / Nitrite: NEGATIVE   Leuk Esterase: NEGATIVE / RBC: x / WBC x   Sq Epi: x / Non Sq Epi: x / Bacteria: x        RADIOLOGY & ADDITIONAL TESTS:  Reviewed INTERVAL HPI/OVERNIGHT EVENTS:  EDELMIRA overnight. Pt says he slept overnight, and attempted to finish the rest of the bowel jessika. He states he had about 2 bloody stools yesterday and feels a little more tired. He has not eaten since Saturday. Denies dizziness, palpitations, fever, weakness HA, nausea, vomiting, dysuria.     VITAL SIGNS:  T(F): 98.8 (18 @ 05:44)  HR: 63 (18 @ 05:44)  BP: 120/73 (18 @ 05:44)  RR: 18 (18 @ 05:44)  SpO2: 100% (18 @ 05:44)  Wt(kg): --    PHYSICAL EXAM:    Constitutional:  male, pleasant, NAD, restign comfortably  HEENT: sclera nonicteric  Respiratory: CTAB  Cardiovascular: normal s1s2, no m/r/g  Gastrointestinal: soft, NTND, non distended, normoactive bowel sounds, nontympanic  Extremities: Warm, well perfused, +1 pitting edema ankles bilaterally  Neurological: aa0x3  Skin: warm, dry, cap refill + 2    MEDICATIONS  (STANDING):  levETIRAcetam 1000 milliGRAM(s) Oral two times a day  pantoprazole  Injectable 40 milliGRAM(s) IV Push two times a day  predniSONE   Tablet 20 milliGRAM(s) Oral daily  sodium chloride 0.9%. 1000 milliLiter(s) (100 mL/Hr) IV Continuous <Continuous>    MEDICATIONS  (PRN):      Allergies    No Known Allergies    Intolerances        LABS:                        8.7    4.9   )-----------( 167      ( 2018 23:05 )             27.7     07-16    146<H>  |  105  |  18  ----------------------------<  90  3.4<L>   |  27  |  1.12    Ca    8.6      2018 06:36  Mg     2.0     07-16    TPro  5.8<L>  /  Alb  4.0  /  TBili  0.6  /  DBili  x   /  AST  21  /  ALT  35  /  AlkPhos  41  07-15    PT/INR - ( 15 Jul 2018 18:21 )   PT: 11.7 sec;   INR: 1.05          PTT - ( 15 Jul 2018 18:21 )  PTT:29.9 sec  Urinalysis Basic - ( 2018 01:05 )    Color: Yellow / Appearance: Clear / S.015 / pH: x  Gluc: x / Ketone: NEGATIVE  / Bili: Negative / Urobili: 0.2 E.U./dL   Blood: x / Protein: NEGATIVE mg/dL / Nitrite: NEGATIVE   Leuk Esterase: NEGATIVE / RBC: x / WBC x   Sq Epi: x / Non Sq Epi: x / Bacteria: x        RADIOLOGY & ADDITIONAL TESTS:  Reviewed

## 2018-07-17 NOTE — PROGRESS NOTE ADULT - PROBLEM SELECTOR PLAN 3
Hypokalemia 2/2 to blood loss in the setting of hematochezia  -K 3.4 again, will give 40meQ K  -c/w repletion as necessary  -will continue to monitor

## 2018-07-17 NOTE — PROGRESS NOTE ADULT - ASSESSMENT
73M with know diverticulosis and long term use of Steroids, presented with 3 days BRBPR, CT angio showed extensive colonic diverticulosis with no blush, Hb is stable around 8.7, He denies Abdominal pain, nausea or vomiting, any new Bloody BM, Planned for Endoscopy/Colonoscopy with GI today.    Plan:  - NPO  - IV fluid  - Cont Bowel prep (Patient almost completed his bowel prep)  - EGD and colonoscopy with GI  - Will follow up after.    Mohammad Abdallat  Gen Surg

## 2018-07-17 NOTE — PROGRESS NOTE ADULT - SUBJECTIVE AND OBJECTIVE BOX
Subjective:  Doing well, afebrile, denies any abdominal pain, nausea or vomiting. Denies any new Bloody BM    Vital Signs Last 24 Hrs  T(C): 37.1 (2018 05:44), Max: 37.3 (2018 16:16)  T(F): 98.8 (2018 05:44), Max: 99.1 (2018 16:16)  HR: 63 (2018 05:44) (63 - 99)  BP: 120/73 (2018 05:44) (120/73 - 126/76)  BP(mean): --  RR: 18 (2018 05:44) (18 - 18)  SpO2: 100% (2018 05:44) (93% - 100%)    Physical Exam:  General: NAD, resting comfortably in bed  Pulmonary: Nonlabored breathing, no respiratory distress  Cardiovascular: NSR  Abdominal:   Soft, ND,NT        LABS:                        8.7    4.9   )-----------( 167      ( 2018 23:05 )             27.7     07-16    146<H>  |  105  |  18  ----------------------------<  90  3.4<L>   |  27  |  1.12    Ca    8.6      2018 06:36  Mg     2.0     07-16    TPro  5.8<L>  /  Alb  4.0  /  TBili  0.6  /  DBili  x   /  AST  21  /  ALT  35  /  AlkPhos  41  07-15    PT/INR - ( 15 Jul 2018 18:21 )   PT: 11.7 sec;   INR: 1.05          PTT - ( 15 Jul 2018 18:21 )  PTT:29.9 sec  CAPILLARY BLOOD GLUCOSE        Urinalysis Basic - ( 2018 01:05 )    Color: Yellow / Appearance: Clear / S.015 / pH: x  Gluc: x / Ketone: NEGATIVE  / Bili: Negative / Urobili: 0.2 E.U./dL   Blood: x / Protein: NEGATIVE mg/dL / Nitrite: NEGATIVE   Leuk Esterase: NEGATIVE / RBC: x / WBC x   Sq Epi: x / Non Sq Epi: x / Bacteria: x      LIVER FUNCTIONS - ( 15 Jul 2018 18:21 )  Alb: 4.0 g/dL / Pro: 5.8 g/dL / ALK PHOS: 41 U/L / ALT: 35 U/L / AST: 21 U/L / GGT: x

## 2018-07-17 NOTE — PROGRESS NOTE ADULT - PROBLEM SELECTOR PLAN 10
1) PCP: Dr. Mcneill on [facility], [phone]   Contacted on Admission: not contacted on overnight admission  2) Date of Contact with PCP: 7/16 called,  answered, will call again @ 2pm today  3) PCP Contacted at Discharge: (Y/N)  4) Summary of Handoff Given to PCP:   5) Post-Discharge Appointment Date and Location:

## 2018-07-17 NOTE — PROGRESS NOTE ADULT - PROBLEM SELECTOR PLAN 8
- Patient reports 6 years of keppra due to one syncopal episode.   - called PCP, still unclear etiology of syncope  -c/w keppra 1000mg BID for seizure prophylaxis  -will call PCP regarding this home medicaiton

## 2018-07-17 NOTE — PROGRESS NOTE ADULT - ASSESSMENT
73 year old M with PMH diverticulosis presented with 3 days of BRBPR, admitted for investigation for hematochezia. Currently hemodynamically stable, Hb 12.2 on admission, Hgb now 8.7. Pt will receive EGD and colonoscopy today.

## 2018-07-18 ENCOUNTER — TRANSCRIPTION ENCOUNTER (OUTPATIENT)
Age: 73
End: 2018-07-18

## 2018-07-18 LAB
ANION GAP SERPL CALC-SCNC: 12 MMOL/L — SIGNIFICANT CHANGE UP (ref 5–17)
ANION GAP SERPL CALC-SCNC: 9 MMOL/L — SIGNIFICANT CHANGE UP (ref 5–17)
BUN SERPL-MCNC: 6 MG/DL — LOW (ref 7–23)
BUN SERPL-MCNC: 8 MG/DL — SIGNIFICANT CHANGE UP (ref 7–23)
CALCIUM SERPL-MCNC: 7.8 MG/DL — LOW (ref 8.4–10.5)
CALCIUM SERPL-MCNC: 7.8 MG/DL — LOW (ref 8.4–10.5)
CHLORIDE SERPL-SCNC: 102 MMOL/L — SIGNIFICANT CHANGE UP (ref 96–108)
CHLORIDE SERPL-SCNC: 105 MMOL/L — SIGNIFICANT CHANGE UP (ref 96–108)
CO2 SERPL-SCNC: 26 MMOL/L — SIGNIFICANT CHANGE UP (ref 22–31)
CO2 SERPL-SCNC: 27 MMOL/L — SIGNIFICANT CHANGE UP (ref 22–31)
CREAT SERPL-MCNC: 1.1 MG/DL — SIGNIFICANT CHANGE UP (ref 0.5–1.3)
CREAT SERPL-MCNC: 1.15 MG/DL — SIGNIFICANT CHANGE UP (ref 0.5–1.3)
GLUCOSE SERPL-MCNC: 135 MG/DL — HIGH (ref 70–99)
GLUCOSE SERPL-MCNC: 197 MG/DL — HIGH (ref 70–99)
HCT VFR BLD CALC: 22.1 % — LOW (ref 39–50)
HCT VFR BLD CALC: 23.1 % — LOW (ref 39–50)
HCT VFR BLD CALC: 25.1 % — LOW (ref 39–50)
HCT VFR BLD CALC: 25.2 % — LOW (ref 39–50)
HGB BLD-MCNC: 6.9 G/DL — CRITICAL LOW (ref 13–17)
HGB BLD-MCNC: 7.4 G/DL — LOW (ref 13–17)
HGB BLD-MCNC: 8 G/DL — LOW (ref 13–17)
HGB BLD-MCNC: 8 G/DL — LOW (ref 13–17)
MAGNESIUM SERPL-MCNC: 1.7 MG/DL — SIGNIFICANT CHANGE UP (ref 1.6–2.6)
MCHC RBC-ENTMCNC: 28.9 PG — SIGNIFICANT CHANGE UP (ref 27–34)
MCHC RBC-ENTMCNC: 29.3 PG — SIGNIFICANT CHANGE UP (ref 27–34)
MCHC RBC-ENTMCNC: 29.5 PG — SIGNIFICANT CHANGE UP (ref 27–34)
MCHC RBC-ENTMCNC: 29.5 PG — SIGNIFICANT CHANGE UP (ref 27–34)
MCHC RBC-ENTMCNC: 31.2 G/DL — LOW (ref 32–36)
MCHC RBC-ENTMCNC: 31.7 G/DL — LOW (ref 32–36)
MCHC RBC-ENTMCNC: 31.9 G/DL — LOW (ref 32–36)
MCHC RBC-ENTMCNC: 32 G/DL — SIGNIFICANT CHANGE UP (ref 32–36)
MCV RBC AUTO: 92 FL — SIGNIFICANT CHANGE UP (ref 80–100)
MCV RBC AUTO: 92.3 FL — SIGNIFICANT CHANGE UP (ref 80–100)
MCV RBC AUTO: 92.5 FL — SIGNIFICANT CHANGE UP (ref 80–100)
MCV RBC AUTO: 92.6 FL — SIGNIFICANT CHANGE UP (ref 80–100)
PLATELET # BLD AUTO: 143 K/UL — LOW (ref 150–400)
PLATELET # BLD AUTO: 147 K/UL — LOW (ref 150–400)
PLATELET # BLD AUTO: 153 K/UL — SIGNIFICANT CHANGE UP (ref 150–400)
PLATELET # BLD AUTO: 156 K/UL — SIGNIFICANT CHANGE UP (ref 150–400)
POTASSIUM SERPL-MCNC: 2.9 MMOL/L — CRITICAL LOW (ref 3.5–5.3)
POTASSIUM SERPL-MCNC: 4.3 MMOL/L — SIGNIFICANT CHANGE UP (ref 3.5–5.3)
POTASSIUM SERPL-SCNC: 2.9 MMOL/L — CRITICAL LOW (ref 3.5–5.3)
POTASSIUM SERPL-SCNC: 4.3 MMOL/L — SIGNIFICANT CHANGE UP (ref 3.5–5.3)
RBC # BLD: 2.39 M/UL — LOW (ref 4.2–5.8)
RBC # BLD: 2.51 M/UL — LOW (ref 4.2–5.8)
RBC # BLD: 2.71 M/UL — LOW (ref 4.2–5.8)
RBC # BLD: 2.73 M/UL — LOW (ref 4.2–5.8)
RBC # FLD: 14.9 % — SIGNIFICANT CHANGE UP (ref 10.3–16.9)
RBC # FLD: 15.2 % — SIGNIFICANT CHANGE UP (ref 10.3–16.9)
SODIUM SERPL-SCNC: 140 MMOL/L — SIGNIFICANT CHANGE UP (ref 135–145)
SODIUM SERPL-SCNC: 141 MMOL/L — SIGNIFICANT CHANGE UP (ref 135–145)
SURGICAL PATHOLOGY STUDY: SIGNIFICANT CHANGE UP
WBC # BLD: 3.4 K/UL — LOW (ref 3.8–10.5)
WBC # BLD: 4.2 K/UL — SIGNIFICANT CHANGE UP (ref 3.8–10.5)
WBC # BLD: 4.8 K/UL — SIGNIFICANT CHANGE UP (ref 3.8–10.5)
WBC # BLD: 5.3 K/UL — SIGNIFICANT CHANGE UP (ref 3.8–10.5)
WBC # FLD AUTO: 3.4 K/UL — LOW (ref 3.8–10.5)
WBC # FLD AUTO: 4.2 K/UL — SIGNIFICANT CHANGE UP (ref 3.8–10.5)
WBC # FLD AUTO: 4.8 K/UL — SIGNIFICANT CHANGE UP (ref 3.8–10.5)
WBC # FLD AUTO: 5.3 K/UL — SIGNIFICANT CHANGE UP (ref 3.8–10.5)

## 2018-07-18 PROCEDURE — 99233 SBSQ HOSP IP/OBS HIGH 50: CPT

## 2018-07-18 RX ORDER — ACETAMINOPHEN 500 MG
650 TABLET ORAL DAILY
Qty: 0 | Refills: 0 | Status: DISCONTINUED | OUTPATIENT
Start: 2018-07-18 | End: 2018-07-18

## 2018-07-18 RX ORDER — POTASSIUM CHLORIDE 20 MEQ
20 PACKET (EA) ORAL ONCE
Qty: 0 | Refills: 0 | Status: DISCONTINUED | OUTPATIENT
Start: 2018-07-18 | End: 2018-07-18

## 2018-07-18 RX ORDER — FUROSEMIDE 40 MG
40 TABLET ORAL ONCE
Qty: 0 | Refills: 0 | Status: DISCONTINUED | OUTPATIENT
Start: 2018-07-18 | End: 2018-07-18

## 2018-07-18 RX ORDER — POTASSIUM CHLORIDE 20 MEQ
40 PACKET (EA) ORAL ONCE
Qty: 0 | Refills: 0 | Status: COMPLETED | OUTPATIENT
Start: 2018-07-18 | End: 2018-07-18

## 2018-07-18 RX ORDER — POTASSIUM CHLORIDE 20 MEQ
10 PACKET (EA) ORAL
Qty: 0 | Refills: 0 | Status: COMPLETED | OUTPATIENT
Start: 2018-07-18 | End: 2018-07-18

## 2018-07-18 RX ORDER — SODIUM CHLORIDE 9 MG/ML
1000 INJECTION, SOLUTION INTRAVENOUS
Qty: 0 | Refills: 0 | Status: DISCONTINUED | OUTPATIENT
Start: 2018-07-18 | End: 2018-07-19

## 2018-07-18 RX ORDER — MAGNESIUM SULFATE 500 MG/ML
1 VIAL (ML) INJECTION ONCE
Qty: 0 | Refills: 0 | Status: COMPLETED | OUTPATIENT
Start: 2018-07-18 | End: 2018-07-18

## 2018-07-18 RX ORDER — ACETAMINOPHEN 500 MG
650 TABLET ORAL ONCE
Qty: 0 | Refills: 0 | Status: COMPLETED | OUTPATIENT
Start: 2018-07-18 | End: 2018-07-18

## 2018-07-18 RX ADMIN — Medication 100 MILLIEQUIVALENT(S): at 11:59

## 2018-07-18 RX ADMIN — PANTOPRAZOLE SODIUM 40 MILLIGRAM(S): 20 TABLET, DELAYED RELEASE ORAL at 17:11

## 2018-07-18 RX ADMIN — Medication 100 GRAM(S): at 18:36

## 2018-07-18 RX ADMIN — Medication 40 MILLIEQUIVALENT(S): at 11:59

## 2018-07-18 RX ADMIN — SODIUM CHLORIDE 100 MILLILITER(S): 9 INJECTION, SOLUTION INTRAVENOUS at 09:05

## 2018-07-18 RX ADMIN — Medication 100 MILLIEQUIVALENT(S): at 14:32

## 2018-07-18 RX ADMIN — LEVETIRACETAM 1000 MILLIGRAM(S): 250 TABLET, FILM COATED ORAL at 17:11

## 2018-07-18 RX ADMIN — PANTOPRAZOLE SODIUM 40 MILLIGRAM(S): 20 TABLET, DELAYED RELEASE ORAL at 06:47

## 2018-07-18 RX ADMIN — Medication 40 MILLIEQUIVALENT(S): at 09:18

## 2018-07-18 RX ADMIN — SODIUM CHLORIDE 100 MILLILITER(S): 9 INJECTION, SOLUTION INTRAVENOUS at 18:44

## 2018-07-18 RX ADMIN — Medication 650 MILLIGRAM(S): at 02:45

## 2018-07-18 RX ADMIN — Medication 100 MILLIEQUIVALENT(S): at 16:39

## 2018-07-18 RX ADMIN — LEVETIRACETAM 1000 MILLIGRAM(S): 250 TABLET, FILM COATED ORAL at 06:47

## 2018-07-18 RX ADMIN — Medication 20 MILLIGRAM(S): at 09:05

## 2018-07-18 NOTE — DISCHARGE NOTE ADULT - INSTRUCTIONS
A high fiber diet (with vegetables) is highly recommended Upon discharge, please have a soft liquid diet such as foods that are smashed, puréed, chopped very small, combined with sauce or gravy, or softened in liquid. When you meet your primary care physician, you may ask him if you can advance your diet. Upon discharge, please have a soft liquid diet such as foods that are smashed, puréed, chopped very small, combined with sauce or gravy, or softened in liquid. When you meet your primary care physician or GI doctor, you may ask him if you can advance your diet.

## 2018-07-18 NOTE — DISCHARGE NOTE ADULT - PLAN OF CARE
Follow up with gastroenterologist upon discharge You were admitted into the hospital because you had blood in your stools. You were evaluated with an esophagogastroduodenoscopy and colonoscopy to find the source of your bleeding. Your source of bleeding was from your diverticulosis in your intestine. We gave you 1 red blood cell transfusion when your hemoglobin dropped to 6.9. Please eat a fiber diet and keep taking protonix once a day. You were admitted into the hospital because you had blood in your stools. You were evaluated with an esophagogastroduodenoscopy and colonoscopy to find the source of your bleeding. Your source of bleeding was from your diverticulosis in your intestine. We gave you 2 red blood cell transfusion. Please eat a fiber diet and keep taking protonix once a day. If you pass more bloody bowel movements, please see the nearest emergency room. Discharge home, follow up with gastroenterologist upon discharge. You were admitted into the hospital because you had blood in your stools. You were evaluated with an esophagogastroduodenoscopy and colonoscopy to find the source of your bleeding. Your source of bleeding was from your diverticulosis in your intestine. We gave you 2 red blood cell transfusion. Please eat a fiber diet and keep taking protonix once a day to protect your stomach and intestinal lining. If you pass more bloody bowel movements, please see the nearest emergency room. see above. You have been on Keppra for the last 6 years. Please continue this med and follow with primary care. We spoke to your primary doctor who believes you most likely have rheumatoid arthritis. Please continue your prednisone at the same dose, since you have been taking it for the past 6 years.

## 2018-07-18 NOTE — PROGRESS NOTE ADULT - PROBLEM SELECTOR PLAN 3
Hypokalemia 2/2 to blood loss in the setting of hematochezia  -K 3.4 again, will give 40meQ K  -c/w repletion as necessary  -will continue to monitor Hypokalemia 2/2 to blood loss in the setting of hematochezia   -c/w repletion as necessary  -will continue to monitor

## 2018-07-18 NOTE — PROGRESS NOTE ADULT - ASSESSMENT
73 year old M with PMH diverticulosis presented with 3 days of BRBPR, admitted for investigation for hematochezia. Currently hemodynamically stable, Hb 12.2 on admission. Pt s/p EGD and colonoscopy and s/p 1pRBC since yesterday.

## 2018-07-18 NOTE — PROGRESS NOTE ADULT - SUBJECTIVE AND OBJECTIVE BOX
Pt seen and examined at bedside.    PERTINENT REVIEW OF SYSTEMS:  CONSTITUTIONAL: No weakness, fevers or chills  HEENT: No visual changes; No vertigo or throat pain   GASTROINTESTINAL: No abdominal or epigastric pain. No nausea, vomiting, or hematemesis; No diarrhea or constipation. No melena or hematochezia.  NEUROLOGICAL: No numbness or weakness  SKIN: No itching, burning, rashes, or lesions     Allergies    No Known Allergies    Intolerances      MEDICATIONS:  MEDICATIONS  (STANDING):  levETIRAcetam 1000 milliGRAM(s) Oral two times a day  pantoprazole  Injectable 40 milliGRAM(s) IV Push two times a day  predniSONE   Tablet 20 milliGRAM(s) Oral daily  sodium chloride 0.9%. 1000 milliLiter(s) (100 mL/Hr) IV Continuous <Continuous>    MEDICATIONS  (PRN):    Vital Signs Last 24 Hrs  T(C): 36.8 (18 Jul 2018 04:30), Max: 37.5 (18 Jul 2018 01:01)  T(F): 98.2 (18 Jul 2018 04:30), Max: 99.5 (18 Jul 2018 01:01)  HR: 77 (18 Jul 2018 04:30) (66 - 87)  BP: 115/70 (18 Jul 2018 04:30) (97/56 - 140/70)  BP(mean): --  RR: 20 (18 Jul 2018 04:30) (18 - 20)  SpO2: 96% (18 Jul 2018 04:30) (95% - 98%)    07-17 @ 07:01  -  07-18 @ 07:00  --------------------------------------------------------  IN: 800 mL / OUT: 0 mL / NET: 800 mL      PHYSICAL EXAM:    General: Well developed; well nourished; in no acute distress  HEENT: MMM, conjunctiva and sclera clear  Gastrointestinal: Soft non-tender non-distended; Normal bowel sounds; No hepatosplenomegaly. No rebound or guarding  Skin: Warm and dry. No obvious rash    LABS:                        6.9    4.2   )-----------( 147      ( 17 Jul 2018 23:46 )             22.1                             RADIOLOGY & ADDITIONAL STUDIES: Pt seen and examined at bedside. Denies any BM or blood in stool. Received one unit PRBC overnight as hb dropped to 6.9    PERTINENT REVIEW OF SYSTEMS:  CONSTITUTIONAL: No weakness, fevers or chills  HEENT: No visual changes; No vertigo or throat pain   GASTROINTESTINAL: No abdominal or epigastric pain. No nausea, vomiting, or hematemesis; No diarrhea or constipation. No melena or hematochezia.  NEUROLOGICAL: No numbness or weakness  SKIN: No itching, burning, rashes, or lesions     Allergies    No Known Allergies    Intolerances      MEDICATIONS:  MEDICATIONS  (STANDING):  levETIRAcetam 1000 milliGRAM(s) Oral two times a day  pantoprazole  Injectable 40 milliGRAM(s) IV Push two times a day  predniSONE   Tablet 20 milliGRAM(s) Oral daily  sodium chloride 0.9%. 1000 milliLiter(s) (100 mL/Hr) IV Continuous <Continuous>    MEDICATIONS  (PRN):    Vital Signs Last 24 Hrs  T(C): 36.8 (18 Jul 2018 04:30), Max: 37.5 (18 Jul 2018 01:01)  T(F): 98.2 (18 Jul 2018 04:30), Max: 99.5 (18 Jul 2018 01:01)  HR: 77 (18 Jul 2018 04:30) (66 - 87)  BP: 115/70 (18 Jul 2018 04:30) (97/56 - 140/70)  BP(mean): --  RR: 20 (18 Jul 2018 04:30) (18 - 20)  SpO2: 96% (18 Jul 2018 04:30) (95% - 98%)    07-17 @ 07:01  -  07-18 @ 07:00  --------------------------------------------------------  IN: 800 mL / OUT: 0 mL / NET: 800 mL      PHYSICAL EXAM:    General: Well developed; well nourished; in no acute distress  HEENT: MMM, conjunctiva and sclera clear  Gastrointestinal: Soft non-tender non-distended; Normal bowel sounds; No hepatosplenomegaly. No rebound or guarding  Skin: Warm and dry. No obvious rash    LABS:                        6.9    4.2   )-----------( 147      ( 17 Jul 2018 23:46 )             22.1                             RADIOLOGY & ADDITIONAL STUDIES:

## 2018-07-18 NOTE — PROGRESS NOTE ADULT - PROBLEM SELECTOR PLAN 1
-s/p EGD and colonoscopy (7/18)  -s/p 1 pRBC (Hgb 6.9 on 7/17)  - May be due to chronic steroid use vs alcohol abuse in past vs diverticulosis. - pt on prednisone 20mg po due to arthritis as per outpatient PCP  - No known cardiac history, if need to transfuse, keep Hb goal >7  - c/w Protonix 40mg IV BID   - c/w cbc q 6 hrs  - c/w H/H, hemodynamic instability  -f/u GI recommendations -s/p EGD and colonoscopy (7/18)  - Hgb 8 stable s/p 1 pRBC  - May be due to chronic steroid use vs alcohol abuse in past vs diverticulosis. - pt on prednisone 20mg po due to arthritis as per outpatient PCP  - No known cardiac history, if need to transfuse, keep Hb goal >7  - c/w Protonix 40mg IV BID   - c/w cbc q 6 hrs  - c/w H/H, hemodynamic instability  -f/u GI recommendations -s/p EGD and colonoscopy (7/18)  - Hgb 8 today stable s/p 1 pRBC  - May be due to chronic steroid use vs alcohol abuse in past vs diverticulosis. - pt on prednisone 20mg po due to arthritis as per outpatient PCP  - No known cardiac history, if need to transfuse, keep Hb goal >7  - c/w Protonix 40mg IV BID   - c/w cbc q 6 hrs  - c/w H/H, hemodynamic instability  -f/u GI recommendations -s/p EGD and colonoscopy (7/18)  - Hgb 8 today stable s/p 1 pRBC  - No known cardiac history, if need to transfuse, keep Hb goal >7  - c/w Protonix 40mg IV BID   - c/w cbc q 6 hrs  - c/w H/H, hemodynamic instability  -f/u GI recommendations Acute blood loss anemia 2/2 to LGIB:  -s/p EGD and colonoscopy (7/18)  - Hgb 8 today stable s/p 1 pRBC  - No known cardiac history, if need to transfuse, keep Hb goal >7  - c/w Protonix 40mg IV BID   - c/w cbc q 6 hrs  - c/w H/H, hemodynamic instability  -f/u GI recommendations Acute blood loss anemia 2/2 to LGIB:  -s/p EGD and colonoscopy (7/18)  - Hgb 8 today stable s/p 1 pRBC last night  - No known cardiac history, if need to transfuse, keep Hb goal >7  - Protonix 40mg IV BID transitioned to protonix 40mg PO daily  - c/w cbc q 6 hrs  - c/w H/H, hemodynamic instability  - f/u GI recommendations  - will make appt for pt to see Dr. Willis on Tuesdays or Dr. Espinal on Thursdays in 6-8 weeks

## 2018-07-18 NOTE — PROGRESS NOTE ADULT - ASSESSMENT
73M with know diverticulosis and long term use of Steroids, presented with 3 days BRBPR, CT angio showed extensive colonic diverticulosis with no blush,   EGD and Cscope done yesterday, Shows old blood from mid transverse colon to sigmoid, no evidence of active bleeding, patient required 1 unit of pRBC, Hb is stable around 8 now    Plan:  - trend CBC, Transfuse if Hb <7  - Rest of management by GI and Primary team  - Will reevaluate later today  - Will discuss with

## 2018-07-18 NOTE — PROGRESS NOTE ADULT - SUBJECTIVE AND OBJECTIVE BOX
Subjective:  Doing well, afebrile, denies any nausea/vomiting, abdominal pain, didn't pass stool or flatus after the Cscope  Vital Signs Last 24 Hrs  T(C): 36.3 (18 Jul 2018 09:10), Max: 37.5 (18 Jul 2018 01:01)  T(F): 97.4 (18 Jul 2018 09:10), Max: 99.5 (18 Jul 2018 01:01)  HR: 84 (18 Jul 2018 09:10) (66 - 87)  BP: 140/79 (18 Jul 2018 09:10) (97/56 - 140/79)  BP(mean): --  RR: 18 (18 Jul 2018 09:10) (18 - 20)  SpO2: 95% (18 Jul 2018 09:10) (95% - 98%)    Physical Exam:  General: NAD, resting comfortably in bed  Pulmonary: Nonlabored breathing, no respiratory distress  Abdominal: soft, NT/ND, obese        LABS:                        8.0    5.3   )-----------( 153      ( 18 Jul 2018 10:46 )             25.1             CAPILLARY BLOOD GLUCOSE

## 2018-07-18 NOTE — DISCHARGE NOTE ADULT - ADDITIONAL INSTRUCTIONS
Please follow up with your PCP Nasim Santana on   Pelase follow up with gastroenterology on Please follow up with your PCP Dr. Gayathri Mora  on  7/25 at 3:30pm.   Christinease follow up with [facility] gastroenterology with Dr. Willis on 8/20 at 11:15pm.   If you pass more bloody bowel movements, please go to the nearest Emergency Room. Please follow up with your PCP Dr. Dave Mora on 7/25 at 3:30pm.   Please follow up with [facility] [place] gastroenterology with Dr. Gilson Willis on 8/20 at 11:15pm.   If you pass more bloody bowel movements that are large in volume, please go to the nearest Emergency Room. If you have light pink stool several times, call Dr. Willis' office or your regular doctor's office.

## 2018-07-18 NOTE — DISCHARGE NOTE ADULT - HOSPITAL COURSE
73 year old M with PMH diverticulosis who presented and admitted for work up for 3 days of hematochezia on 7/16. Two days prior to presenting to ED, he had a few episodes of birhg red blood per rectum without fevers, chills, dizziness nausea, vomiting, chest pain, shortness of breath, abdominal pain, diarrhea, and constipation. In ED his vital signs were stable, Hb at presentation 12.2 which downtrended to 10.6 on repeat and was given 1L NS and protonix  On the medical floor, patient was asyptomatic. He was given jessika bowel prep on 7/17 and went for EGD and colonoscopy evaluation on 7/18. Colonoscopy was positive for pan diverticulosis, dry blood, and no further active bleeding after a location was cauterized. On 7/19, he received one 1pRBC because he had a hgb of 6.9 after which his hgb became stable at 8. He has been hemodynamically stable ever since. 73 year old M with PMH diverticulosis who presented and admitted for work up for 3 days of hematochezia on 7/16. Two days prior to presenting to ED, he had a few episodes of birhg red blood per rectum without fevers, chills, dizziness nausea, vomiting, chest pain, shortness of breath, abdominal pain, diarrhea, and constipation. In ED his vital signs were stable, Hb at presentation 12.2 which downtrended to 10.6 on repeat and was given 1L NS and protonix  On the medical floor, patient was asyptomatic. He was given jessika bowel prep on 7/17 and went for EGD and colonoscopy evaluation on 7/18. EGD showed esophagitis s/p biopsies. Colonoscopy was positive for pan diverticulosis, multiple diverticula and dry blood, no stigmata of active bleed. On 7/19, he received one 1pRBC because he had a hgb of 6.9 after which his hgb became stable at 8. He has been hemodynamically stable ever since. 73 year old M with PMH diverticulosis who presented and admitted for work up for 3 days of hematochezia on 7/16. Two days prior to presenting to ED, he had a few episodes of birhg red blood per rectum without fevers, chills, dizziness nausea, vomiting, chest pain, shortness of breath, abdominal pain, diarrhea, and constipation. In ED his vital signs were stable, Hb at presentation 12.2 which downtrended to 10.6 on repeat and was given 1L NS and protonix  On the medical floor, patient was asyptomatic. He was given jessika bowel prep on 7/17 and went for EGD and colonoscopy evaluation on 7/18. EGD showed esophagitis s/p biopsies. Colonoscopy was positive for pan diverticulosis, multiple diverticula and dry blood, no stigmata of active bleed. Patient received 2pRBCs when his Hgb dropped below 7. Upon discharge, his Hgb was 8, asymptomatic no active bleeding. 73 year old M with PMH diverticulosis who presented and admitted for work up for 3 days of hematochezia on 7/16. Two days prior to presenting to ED, he had a few episodes of birhg red blood per rectum without fevers, chills, dizziness nausea, vomiting, chest pain, shortness of breath, abdominal pain, diarrhea, and constipation. In ED his vital signs were stable, Hb at presentation 12.2 which downtrended to 10.6 on repeat and was given 1L NS and protonix  On the medical floor, patient was asyptomatic. He was given jessika bowel prep on 7/17 and went for EGD and colonoscopy evaluation on 7/18. EGD showed esophagitis s/p biopsies. Colonoscopy was positive for pan diverticulosis, multiple diverticula and dry blood, no stigmata of active bleed. Later that evening, patient Hgb dropped to 6.9 and given 1pRBC. On 7/20, patient Hgb dropped to 6.9 and given another pRBC and CT angio Abdomen/pelvis showed extensive colonic diverticuli but no active extravasation of bleeding.  Therefore, patient received a total of 2 pRBCs transfusions. Upon discharge, his Hgb was 8, asymptomatic no active bleeding. 73 year old M with PMH diverticulosis who presented and admitted for work up for 3 days of hematochezia on 7/16. Two days prior to presenting to ED, he had a few episodes of birhg red blood per rectum without fevers, chills, dizziness nausea, vomiting, chest pain, shortness of breath, abdominal pain, diarrhea, and constipation. In ED his vital signs were stable, Hb at presentation 12.2 which downtrended to 10.6 on repeat and was given 1L NS and protonix  On the medical floor, patient was asyptomatic. He was given jessika bowel prep on 7/17 and went for EGD and colonoscopy evaluation on 7/18. EGD showed esophagitis s/p biopsies. Colonoscopy was positive for pan diverticulosis, multiple diverticula and dry blood, no stigmata of active bleed. Later that evening, patient Hgb dropped to 6.9 and given 1pRBC. On 7/19, patient Hgb dropped to 6.9 and given another pRBC and CT angio Abdomen/pelvis showed extensive colonic diverticuli but no active extravasation of bleeding.  Therefore, patient received a total of 2 pRBCs transfusions. Upon discharge, his Hgb was 8, asymptomatic no active bleeding. 73 year old M with PMH diverticulosis who presented and admitted for work up for 3 days of hematochezia on 7/16. Two days prior to presenting to ED, he had a few episodes of birhg red blood per rectum without fevers, chills, dizziness nausea, vomiting, chest pain, shortness of breath, abdominal pain, diarrhea, and constipation. In ED his vital signs were stable, Hb at presentation 12.2 which downtrended to 10.6 on repeat and was given 1L NS and protonix  On the medical floor, patient was asymptomatic initially. He was given jessika bowel prep on 7/17 and went for EGD and colonoscopy evaluation on 7/18. EGD was unrevealing, and biopsies were taken. Colonoscopy was positive for pan diverticulosis, multiple diverticula and dry blood, no stigmata of active bleed. Later that evening, patient Hgb dropped to 6.9 and given 1pRBC. On 7/19, patient Hgb dropped to 6.9 and given another pRBC and CT angio Abdomen/pelvis showed extensive colonic diverticula but no active extravasation of bleeding.  Therefore, patient received a total of 2 pRBCs transfusions. Upon discharge, his Hgb was 8, asymptomatic no active bleeding, set up for appointments with PMD and GI, and prescribed 8 weeks of Protonix once daily.

## 2018-07-18 NOTE — PROGRESS NOTE ADULT - PROBLEM SELECTOR PLAN 4
Hyponatremia 2/2 volume loss in the setting of BRBPR  -s/p 2L ED bolus in ED  -c/w monitor BMP Hyponatremia 2/2 volume loss in the setting of BRBPR  -Switched fluids to NaCl 0.45% in the setting of hypernatrema  -currently asymptomatic  -s/p 2L ED bolus in ED  -c/w monitor BMP

## 2018-07-18 NOTE — DISCHARGE NOTE ADULT - CARE PLAN
Principal Discharge DX:	Diverticulosis  Goal:	Follow up with gastroenterologist upon discharge  Assessment and plan of treatment:	You were admitted into the hospital because you had blood in your stools. You were evaluated with an esophagogastroduodenoscopy and colonoscopy to find the source of your bleeding. Your source of bleeding was from your diverticulosis in your intestine. We gave you 1 red blood cell transfusion when your hemoglobin dropped to 6.9. Please eat a fiber diet and keep taking protonix once a day. Principal Discharge DX:	Diverticulosis  Goal:	Follow up with gastroenterologist upon discharge  Assessment and plan of treatment:	You were admitted into the hospital because you had blood in your stools. You were evaluated with an esophagogastroduodenoscopy and colonoscopy to find the source of your bleeding. Your source of bleeding was from your diverticulosis in your intestine. We gave you 2 red blood cell transfusion. Please eat a fiber diet and keep taking protonix once a day. If you pass more bloody bowel movements, please see the nearest emergency room. Principal Discharge DX:	Acute blood loss anemia  Goal:	Discharge home, follow up with gastroenterologist upon discharge.  Assessment and plan of treatment:	You were admitted into the hospital because you had blood in your stools. You were evaluated with an esophagogastroduodenoscopy and colonoscopy to find the source of your bleeding. Your source of bleeding was from your diverticulosis in your intestine. We gave you 2 red blood cell transfusion. Please eat a fiber diet and keep taking protonix once a day to protect your stomach and intestinal lining. If you pass more bloody bowel movements, please see the nearest emergency room.  Secondary Diagnosis:	Diverticulosis of colon with hemorrhage  Assessment and plan of treatment:	see above.  Secondary Diagnosis:	Seizure disorder  Assessment and plan of treatment:	You have been on Keppra for the last 6 years. Please continue this med and follow with primary care.  Secondary Diagnosis:	Rheumatoid arthritis  Assessment and plan of treatment:	We spoke to your primary doctor who believes you most likely have rheumatoid arthritis. Please continue your prednisone at the same dose, since you have been taking it for the past 6 years.

## 2018-07-18 NOTE — DISCHARGE NOTE ADULT - CARE PROVIDER_API CALL
Gilson Willis (MD), Gastroenterology; Internal Medicine  [address]  [address]  [place], [place] [address]  Phone: [phone]  Fax: [phone] Gilson Willis), Gastroenterology; Internal Medicine  [address]  [address]th [place] [address]  Phone: [phone]  Fax: [phone]    Dave Mora, primary care)  [address] [place], [place] [address] [address]  Phone: [phone]  Fax: (       -

## 2018-07-18 NOTE — PROGRESS NOTE ADULT - PROBLEM SELECTOR PLAN 2
-EGD: small hiatal hernia, no active bleed, mild gastritis  -Colonoscopy: pan diverticulosis, source of bleed, no active bleeding, internal/external hemorrhoids  -will continue to monitor  -less likely diverticulitis as patient presented with no fever or leukocytosis -EGD: small hiatal hernia, no active bleed, mild gastritis  -Colonoscopy: pan diverticulosis, source of bleed, no active bleeding, internal/external hemorrhoids EGD/Colonoscopies performed 7/17/18 by GI team.  -EGD: small hiatal hernia, no active bleed, mild gastritis  -Colonoscopy: pan diverticulosis, source of bleed, no active bleeding, internal/external hemorrhoids

## 2018-07-18 NOTE — PROGRESS NOTE ADULT - SUBJECTIVE AND OBJECTIVE BOX
INTERVAL HPI/OVERNIGHT EVENTS:  Patient was seen and examined at bedside. Yesterday pt had an EGD and colonoscopy without complication. Pt Hgb at 11pm yesterday and [id number].   Overnight, pt states he did not have any further bloody bowel movement and feels less tired from previous days. Denies dizziness, headache, palpitations, fevers, chills, nausea, vomiting.         VITAL SIGNS:  T(F): 98.2 (07-18-18 @ 04:30)  HR: 77 (07-18-18 @ 04:30)  BP: 115/70 (07-18-18 @ 04:30)  RR: 20 (07-18-18 @ 04:30)  SpO2: 96% (07-18-18 @ 04:30)  Wt(kg): --    PHYSICAL EXAM:    Constitutional: WDWN, NAD  HEENT: PERRL, EOMI, sclera non-icteric, neck supple, trachea midline, no masses, no JVD, MMM, good dentition  Respiratory: CTA b/l, good air entry b/l, no wheezing, no rhonchi, no rales, without accessory muscle use and no intercostal retractions  Cardiovascular: RRR, normal S1S2, no M/R/G  Gastrointestinal: soft, NTND, no masses palpable, BS normal  Extremities: Warm, well perfused, pulses equal bilateral upper and lower extremities, no edema, no clubbing  Neurological: AAOx3, CN Grossly intact  Skin: Normal temperature, warm, dry    MEDICATIONS  (STANDING):  levETIRAcetam 1000 milliGRAM(s) Oral two times a day  pantoprazole  Injectable 40 milliGRAM(s) IV Push two times a day  predniSONE   Tablet 20 milliGRAM(s) Oral daily  sodium chloride 0.9%. 1000 milliLiter(s) (100 mL/Hr) IV Continuous <Continuous>    MEDICATIONS  (PRN):      Allergies    No Known Allergies    Intolerances        LABS:                        7.4    3.4   )-----------( 143      ( 18 Jul 2018 07:26 )             23.1                 RADIOLOGY & ADDITIONAL TESTS:  Reviewed INTERVAL HPI/OVERNIGHT EVENTS:  Patient was seen and examined at bedside. Yesterday pt had an EGD and colonoscopy without complication. Pt Hgb at 11pm yesterday and [id number].   Overnight, pt states he did not have any further bloody bowel movement and feels less tired from previous days. Denies dizziness, headache, palpitations, fevers, chills, nausea, vomiting.         VITAL SIGNS:  T(F): 98.2 (07-18-18 @ 04:30)  HR: 77 (07-18-18 @ 04:30)  BP: 115/70 (07-18-18 @ 04:30)  RR: 20 (07-18-18 @ 04:30)  SpO2: 96% (07-18-18 @ 04:30)  Wt(kg): --    PHYSICAL EXAM:    Constitutional:  male, pleasant, NAD, restign comfortably  HEENT: sclera nonicteric  Respiratory: CTAB  Cardiovascular: normal s1s2, no m/r/g  Gastrointestinal: soft, NTND, non distended, normoactive bowel sounds, nontympanic  Extremities: Warm, well perfused, +1 pitting edema ankles bilaterally  Neurological: aa0x3  Skin: warm, dry, cap refill + 2      MEDICATIONS  (STANDING):  levETIRAcetam 1000 milliGRAM(s) Oral two times a day  pantoprazole  Injectable 40 milliGRAM(s) IV Push two times a day  predniSONE   Tablet 20 milliGRAM(s) Oral daily  sodium chloride 0.9%. 1000 milliLiter(s) (100 mL/Hr) IV Continuous <Continuous>    MEDICATIONS  (PRN):      Allergies    No Known Allergies    Intolerances        LABS:                        7.4    3.4   )-----------( 143      ( 18 Jul 2018 07:26 )             23.1                 RADIOLOGY & ADDITIONAL TESTS:  Reviewed INTERVAL HPI/OVERNIGHT EVENTS:  Patient was seen and examined at bedside. Yesterday pt had an EGD and colonoscopy without complication. Pt Hgb at 11pm yesterday and [id number]. Also had asymptomatic 97/56.   Overnight, pt states he did not have any further bloody bowel movement and feels less tired from previous days. Denies dizziness, headache, palpitations, fevers, chills, nausea, vomiting.         VITAL SIGNS:  T(F): 98.2 (07-18-18 @ 04:30)  HR: 77 (07-18-18 @ 04:30)  BP: 115/70 (07-18-18 @ 04:30)  RR: 20 (07-18-18 @ 04:30)  SpO2: 96% (07-18-18 @ 04:30)  Wt(kg): --    PHYSICAL EXAM:    Constitutional:  male, pleasant, NAD, comfortable  HEENT: sclera nonicteric  Respiratory: CTAB  Cardiovascular: normal s1s2, no m/r/g  Gastrointestinal: soft, NTND, non distended, normoactive bowel sounds, nontympanic  Extremities: Warm, well perfused, +1 pitting edema ankles bilaterally  Neurological: aa0x3  Skin: warm, dry, cap refill + 2      MEDICATIONS  (STANDING):  levETIRAcetam 1000 milliGRAM(s) Oral two times a day  pantoprazole  Injectable 40 milliGRAM(s) IV Push two times a day  predniSONE   Tablet 20 milliGRAM(s) Oral daily  sodium chloride 0.9%. 1000 milliLiter(s) (100 mL/Hr) IV Continuous <Continuous>    MEDICATIONS  (PRN):      Allergies    No Known Allergies    Intolerances        LABS:                        7.4    3.4   )-----------( 143      ( 18 Jul 2018 07:26 )             23.1                 RADIOLOGY & ADDITIONAL TESTS:  Reviewed

## 2018-07-18 NOTE — DISCHARGE NOTE ADULT - MEDICATION SUMMARY - MEDICATIONS TO TAKE
I will START or STAY ON the medications listed below when I get home from the hospital:    predniSONE 20 mg oral tablet  -- 1 tab(s) by mouth once a day  -- Indication: For rheumatoid arthritis    Keppra 1000 mg oral tablet  -- 1 tab(s) by mouth 2 times a day  -- Indication: For Seizure prophylaxis    pantoprazole 40 mg oral delayed release tablet  -- 1 tab(s) by mouth once a day (before a meal)  -- Indication: For gastritis prophylaxis I will START or STAY ON the medications listed below when I get home from the hospital:    predniSONE 20 mg oral tablet  -- 1 tab(s) by mouth once a day  -- Indication: For rheumatoid arthritis    Keppra 1000 mg oral tablet  -- 1 tab(s) by mouth 2 times a day  -- Indication: For Seizure prophylaxis    Protonix 40 mg oral delayed release tablet  -- 1 tab(s) by mouth once a day   -- It is very important that you take or use this exactly as directed.  Do not skip doses or discontinue unless directed by your doctor.  Obtain medical advice before taking any non-prescription drugs as some may affect the action of this medication.  Swallow whole.  Do not crush.    -- Indication: For mild gastritis

## 2018-07-18 NOTE — PROGRESS NOTE ADULT - PROBLEM SELECTOR PLAN 10
1) PCP: Dr. Mcneill on [facility], [phone]   Contacted on Admission: not contacted on overnight admission  2) Date of Contact with PCP: 7/16 called,  answered, will call again @ 2pm today  3) PCP Contacted at Discharge: (Y/N)  4) Summary of Handoff Given to PCP:   5) Post-Discharge Appointment Date and Location: 1) PCP: Dr. Mcneill on [facility], [phone]   Contacted on Admission: not contacted on overnight admission  2) Date of Contact with PCP: 7/16 called,  answered, will call again @ 2pm today  3) PCP Contacted at Discharge: (Y/N)  4) Summary of Handoff Given to PCP: TBA  5) Post-Discharge Appointment Date and Location: TBA

## 2018-07-18 NOTE — DISCHARGE NOTE ADULT - CARE PROVIDERS DIRECT ADDRESSES
,[email or web address],DirectAddress_Unknown

## 2018-07-18 NOTE — DISCHARGE NOTE ADULT - PROVIDER TOKENS
TOKEN:'[id number]' TOKEN:'[id number]',FREE:[LAST:[Fteha],FIRST:[Dave HAHN, primary care)],PHONE:[phone]],FAX:[(   )    -],ADDRESS:[[address] [place] [address] [address] [address]]]

## 2018-07-18 NOTE — PROGRESS NOTE ADULT - ASSESSMENT
Assessment and plan: 73 year old M with PMH diverticulosis who presents with 3 days of hematochezia. Hb downtrended from 12.2 to 10.6,vitals stable. Rectal exam....  Possible etiologies could be diverticulosis/ hemorrhoids. No significant risk factors for ischemia/infectious etiology. Although patient complaining of BRBPR and on our rectal exam we didnot see any significant blood, the rectal exam per surgery and primary team did show dark blood/ maroon stool/melena? He also has a history of gastric ulcer?  -s/p EGD and colonoscopy 7/17  -trend h/h   -monitor vitals, monitor for further signs of bleeding  -goal transfusion hb<7  - today Assessment and plan: 73 year old M with PMH diverticulosis who presents with 3 days of hematochezia.   -s/p EGD and colonoscopy 7/17, EGD showed esophagitis s/p biopsies, colonoscopy showed multiple diverticula and old heme, no stigmata of active bleed  -h/h stable this am  -tolerating regular diet, denies any symptoms  -trend h/h   -monitor vitals, monitor for further signs of bleeding  -goal transfusion hb<7  -will follow esophageal bx  -PPI once daily for now  -will follow up with his own gastroenterologist or the Tuesday am fellows clinic with Dr Willis in 6-8 weeks.

## 2018-07-18 NOTE — DISCHARGE NOTE ADULT - PATIENT PORTAL LINK FT
You can access the [facility] Patient Portal, offered by [facility], by registering with the following website: [email or web address]

## 2018-07-19 LAB
ANION GAP SERPL CALC-SCNC: 7 MMOL/L — SIGNIFICANT CHANGE UP (ref 5–17)
ANION GAP SERPL CALC-SCNC: 9 MMOL/L — SIGNIFICANT CHANGE UP (ref 5–17)
BASOPHILS NFR BLD AUTO: 0.2 % — SIGNIFICANT CHANGE UP (ref 0–2)
BUN SERPL-MCNC: 7 MG/DL — SIGNIFICANT CHANGE UP (ref 7–23)
BUN SERPL-MCNC: 8 MG/DL — SIGNIFICANT CHANGE UP (ref 7–23)
CALCIUM SERPL-MCNC: 8.3 MG/DL — LOW (ref 8.4–10.5)
CALCIUM SERPL-MCNC: 8.9 MG/DL — SIGNIFICANT CHANGE UP (ref 8.4–10.5)
CHLORIDE SERPL-SCNC: 106 MMOL/L — SIGNIFICANT CHANGE UP (ref 96–108)
CHLORIDE SERPL-SCNC: 98 MMOL/L — SIGNIFICANT CHANGE UP (ref 96–108)
CO2 SERPL-SCNC: 27 MMOL/L — SIGNIFICANT CHANGE UP (ref 22–31)
CO2 SERPL-SCNC: 29 MMOL/L — SIGNIFICANT CHANGE UP (ref 22–31)
CREAT SERPL-MCNC: 1.08 MG/DL — SIGNIFICANT CHANGE UP (ref 0.5–1.3)
CREAT SERPL-MCNC: 1.13 MG/DL — SIGNIFICANT CHANGE UP (ref 0.5–1.3)
EOSINOPHIL NFR BLD AUTO: 0.3 % — SIGNIFICANT CHANGE UP (ref 0–6)
EOSINOPHIL NFR BLD AUTO: 0.3 % — SIGNIFICANT CHANGE UP (ref 0–6)
EOSINOPHIL NFR BLD AUTO: 2.2 % — SIGNIFICANT CHANGE UP (ref 0–6)
GLUCOSE SERPL-MCNC: 102 MG/DL — HIGH (ref 70–99)
GLUCOSE SERPL-MCNC: 132 MG/DL — HIGH (ref 70–99)
HCT VFR BLD CALC: 21.6 % — LOW (ref 39–50)
HCT VFR BLD CALC: 25.2 % — LOW (ref 39–50)
HCT VFR BLD CALC: 25.2 % — LOW (ref 39–50)
HCT VFR BLD CALC: 27.2 % — LOW (ref 39–50)
HGB BLD-MCNC: 6.7 G/DL — CRITICAL LOW (ref 13–17)
HGB BLD-MCNC: 8 G/DL — LOW (ref 13–17)
HGB BLD-MCNC: 8.1 G/DL — LOW (ref 13–17)
HGB BLD-MCNC: 8.9 G/DL — LOW (ref 13–17)
LYMPHOCYTES # BLD AUTO: 14.3 % — SIGNIFICANT CHANGE UP (ref 13–44)
LYMPHOCYTES # BLD AUTO: 6.5 % — LOW (ref 13–44)
LYMPHOCYTES # BLD AUTO: 9.6 % — LOW (ref 13–44)
MAGNESIUM SERPL-MCNC: 2 MG/DL — SIGNIFICANT CHANGE UP (ref 1.6–2.6)
MCHC RBC-ENTMCNC: 28.5 PG — SIGNIFICANT CHANGE UP (ref 27–34)
MCHC RBC-ENTMCNC: 29 PG — SIGNIFICANT CHANGE UP (ref 27–34)
MCHC RBC-ENTMCNC: 29.3 PG — SIGNIFICANT CHANGE UP (ref 27–34)
MCHC RBC-ENTMCNC: 29.7 PG — SIGNIFICANT CHANGE UP (ref 27–34)
MCHC RBC-ENTMCNC: 31 G/DL — LOW (ref 32–36)
MCHC RBC-ENTMCNC: 31.7 G/DL — LOW (ref 32–36)
MCHC RBC-ENTMCNC: 32.1 G/DL — SIGNIFICANT CHANGE UP (ref 32–36)
MCHC RBC-ENTMCNC: 32.7 G/DL — SIGNIFICANT CHANGE UP (ref 32–36)
MCV RBC AUTO: 90.7 FL — SIGNIFICANT CHANGE UP (ref 80–100)
MCV RBC AUTO: 91.3 FL — SIGNIFICANT CHANGE UP (ref 80–100)
MCV RBC AUTO: 91.3 FL — SIGNIFICANT CHANGE UP (ref 80–100)
MCV RBC AUTO: 91.9 FL — SIGNIFICANT CHANGE UP (ref 80–100)
MONOCYTES NFR BLD AUTO: 11.4 % — SIGNIFICANT CHANGE UP (ref 2–14)
MONOCYTES NFR BLD AUTO: 4.1 % — SIGNIFICANT CHANGE UP (ref 2–14)
MONOCYTES NFR BLD AUTO: 8.5 % — SIGNIFICANT CHANGE UP (ref 2–14)
NEUTROPHILS NFR BLD AUTO: 71.9 % — SIGNIFICANT CHANGE UP (ref 43–77)
NEUTROPHILS NFR BLD AUTO: 81.4 % — HIGH (ref 43–77)
NEUTROPHILS NFR BLD AUTO: 88.9 % — HIGH (ref 43–77)
PLATELET # BLD AUTO: 140 K/UL — LOW (ref 150–400)
PLATELET # BLD AUTO: 154 K/UL — SIGNIFICANT CHANGE UP (ref 150–400)
PLATELET # BLD AUTO: 163 K/UL — SIGNIFICANT CHANGE UP (ref 150–400)
PLATELET # BLD AUTO: 171 K/UL — SIGNIFICANT CHANGE UP (ref 150–400)
POTASSIUM SERPL-MCNC: 3.2 MMOL/L — LOW (ref 3.5–5.3)
POTASSIUM SERPL-MCNC: 4 MMOL/L — SIGNIFICANT CHANGE UP (ref 3.5–5.3)
POTASSIUM SERPL-SCNC: 3.2 MMOL/L — LOW (ref 3.5–5.3)
POTASSIUM SERPL-SCNC: 4 MMOL/L — SIGNIFICANT CHANGE UP (ref 3.5–5.3)
RBC # BLD: 2.35 M/UL — LOW (ref 4.2–5.8)
RBC # BLD: 2.76 M/UL — LOW (ref 4.2–5.8)
RBC # BLD: 2.76 M/UL — LOW (ref 4.2–5.8)
RBC # BLD: 3 M/UL — LOW (ref 4.2–5.8)
RBC # FLD: 15.3 % — SIGNIFICANT CHANGE UP (ref 10.3–16.9)
RBC # FLD: 15.4 % — SIGNIFICANT CHANGE UP (ref 10.3–16.9)
RBC # FLD: 15.5 % — SIGNIFICANT CHANGE UP (ref 10.3–16.9)
RBC # FLD: 15.6 % — SIGNIFICANT CHANGE UP (ref 10.3–16.9)
SODIUM SERPL-SCNC: 136 MMOL/L — SIGNIFICANT CHANGE UP (ref 135–145)
SODIUM SERPL-SCNC: 140 MMOL/L — SIGNIFICANT CHANGE UP (ref 135–145)
WBC # BLD: 4.5 K/UL — SIGNIFICANT CHANGE UP (ref 3.8–10.5)
WBC # BLD: 5.6 K/UL — SIGNIFICANT CHANGE UP (ref 3.8–10.5)
WBC # BLD: 6.2 K/UL — SIGNIFICANT CHANGE UP (ref 3.8–10.5)
WBC # BLD: 6.3 K/UL — SIGNIFICANT CHANGE UP (ref 3.8–10.5)
WBC # FLD AUTO: 4.5 K/UL — SIGNIFICANT CHANGE UP (ref 3.8–10.5)
WBC # FLD AUTO: 5.6 K/UL — SIGNIFICANT CHANGE UP (ref 3.8–10.5)
WBC # FLD AUTO: 6.2 K/UL — SIGNIFICANT CHANGE UP (ref 3.8–10.5)
WBC # FLD AUTO: 6.3 K/UL — SIGNIFICANT CHANGE UP (ref 3.8–10.5)

## 2018-07-19 PROCEDURE — 74174 CTA ABD&PLVS W/CONTRAST: CPT | Mod: 26

## 2018-07-19 PROCEDURE — 99233 SBSQ HOSP IP/OBS HIGH 50: CPT

## 2018-07-19 RX ORDER — POTASSIUM CHLORIDE 20 MEQ
40 PACKET (EA) ORAL ONCE
Qty: 0 | Refills: 0 | Status: DISCONTINUED | OUTPATIENT
Start: 2018-07-19 | End: 2018-07-19

## 2018-07-19 RX ORDER — POTASSIUM CHLORIDE 20 MEQ
10 PACKET (EA) ORAL
Qty: 0 | Refills: 0 | Status: DISCONTINUED | OUTPATIENT
Start: 2018-07-19 | End: 2018-07-19

## 2018-07-19 RX ORDER — POTASSIUM CHLORIDE 20 MEQ
40 PACKET (EA) ORAL EVERY 4 HOURS
Qty: 0 | Refills: 0 | Status: COMPLETED | OUTPATIENT
Start: 2018-07-19 | End: 2018-07-19

## 2018-07-19 RX ORDER — PANTOPRAZOLE SODIUM 20 MG/1
40 TABLET, DELAYED RELEASE ORAL
Qty: 0 | Refills: 0 | Status: DISCONTINUED | OUTPATIENT
Start: 2018-07-19 | End: 2018-07-20

## 2018-07-19 RX ADMIN — Medication 40 MILLIEQUIVALENT(S): at 16:30

## 2018-07-19 RX ADMIN — PANTOPRAZOLE SODIUM 40 MILLIGRAM(S): 20 TABLET, DELAYED RELEASE ORAL at 06:36

## 2018-07-19 RX ADMIN — Medication 20 MILLIGRAM(S): at 06:34

## 2018-07-19 RX ADMIN — Medication 40 MILLIEQUIVALENT(S): at 09:04

## 2018-07-19 RX ADMIN — LEVETIRACETAM 1000 MILLIGRAM(S): 250 TABLET, FILM COATED ORAL at 06:34

## 2018-07-19 RX ADMIN — SODIUM CHLORIDE 100 MILLILITER(S): 9 INJECTION, SOLUTION INTRAVENOUS at 06:36

## 2018-07-19 RX ADMIN — LEVETIRACETAM 1000 MILLIGRAM(S): 250 TABLET, FILM COATED ORAL at 17:20

## 2018-07-19 NOTE — PROGRESS NOTE ADULT - ASSESSMENT
73 year old M with PMH diverticulosis presented with 3 days of BRBPR, admitted for investigation for hematochezia. Currently hemodynamically stable, Hb 12.2 on admission. Pt s/p EGD and colonoscopy and s/p 1pRBC now with Hgb 6.7 this morning, being given 1 unit pRBC

## 2018-07-19 NOTE — PROGRESS NOTE ADULT - PROBLEM SELECTOR PLAN 3
Hypokalemia 2/2 to blood loss in the setting of hematochezia   -c/w repletion as necessary  -will continue to monitor

## 2018-07-19 NOTE — PROGRESS NOTE ADULT - SUBJECTIVE AND OBJECTIVE BOX
Pt seen and examined at bedside. denies any complaints, tolerating diet well. Had another BM with bright red blood this am. Vitals stable    PERTINENT REVIEW OF SYSTEMS:  CONSTITUTIONAL: No weakness, fevers or chills  HEENT: No visual changes; No vertigo or throat pain   GASTROINTESTINAL: No abdominal or epigastric pain. No nausea, vomiting, or hematemesis; No diarrhea or constipation. No melena or hematochezia.  NEUROLOGICAL: No numbness or weakness  SKIN: No itching, burning, rashes, or lesions     Allergies    No Known Allergies    Intolerances      MEDICATIONS:  MEDICATIONS  (STANDING):  levETIRAcetam 1000 milliGRAM(s) Oral two times a day  pantoprazole    Tablet 40 milliGRAM(s) Oral before breakfast  potassium chloride    Tablet ER 40 milliEquivalent(s) Oral every 4 hours  predniSONE   Tablet 20 milliGRAM(s) Oral daily    MEDICATIONS  (PRN):    Vital Signs Last 24 Hrs  T(C): 36.8 (19 Jul 2018 08:35), Max: 37 (19 Jul 2018 05:10)  T(F): 98.2 (19 Jul 2018 08:35), Max: 98.6 (19 Jul 2018 05:10)  HR: 74 (19 Jul 2018 08:35) (74 - 84)  BP: 125/66 (19 Jul 2018 08:35) (118/65 - 125/71)  BP(mean): 93 (18 Jul 2018 15:39) (93 - 93)  RR: 18 (19 Jul 2018 08:35) (17 - 19)  SpO2: 96% (19 Jul 2018 08:35) (96% - 99%)    07-18 @ 07:01  -  07-19 @ 07:00  --------------------------------------------------------  IN: 1700 mL / OUT: 1320 mL / NET: 380 mL      PHYSICAL EXAM:    General: Well developed; well nourished; in no acute distress  HEENT: MMM, conjunctiva and sclera clear  Gastrointestinal: Soft non-tender non-distended; Normal bowel sounds; No hepatosplenomegaly. No rebound or guarding  Skin: Warm and dry. No obvious rash    LABS:                        6.7    4.5   )-----------( 140      ( 19 Jul 2018 07:06 )             21.6     07-19    140  |  106  |  8   ----------------------------<  102<H>  3.2<L>   |  27  |  1.13    Ca    8.3<L>      19 Jul 2018 07:06  Mg     2.0     07-19                        RADIOLOGY & ADDITIONAL STUDIES: Pt seen and examined at bedside. denies any complaints, tolerating diet well. Had another BM with bright red blood this am. Vitals stable    PERTINENT REVIEW OF SYSTEMS:  CONSTITUTIONAL: No weakness, fevers or chills  HEENT: No visual changes; No vertigo or throat pain   GASTROINTESTINAL: No abdominal or epigastric pain. No nausea, vomiting, or hematemesis  NEUROLOGICAL: No numbness or weakness  SKIN: No itching, burning, rashes, or lesions     Allergies    No Known Allergies    Intolerances      MEDICATIONS:  MEDICATIONS  (STANDING):  levETIRAcetam 1000 milliGRAM(s) Oral two times a day  pantoprazole    Tablet 40 milliGRAM(s) Oral before breakfast  potassium chloride    Tablet ER 40 milliEquivalent(s) Oral every 4 hours  predniSONE   Tablet 20 milliGRAM(s) Oral daily    MEDICATIONS  (PRN):    Vital Signs Last 24 Hrs  T(C): 36.8 (19 Jul 2018 08:35), Max: 37 (19 Jul 2018 05:10)  T(F): 98.2 (19 Jul 2018 08:35), Max: 98.6 (19 Jul 2018 05:10)  HR: 74 (19 Jul 2018 08:35) (74 - 84)  BP: 125/66 (19 Jul 2018 08:35) (118/65 - 125/71)  BP(mean): 93 (18 Jul 2018 15:39) (93 - 93)  RR: 18 (19 Jul 2018 08:35) (17 - 19)  SpO2: 96% (19 Jul 2018 08:35) (96% - 99%)    07-18 @ 07:01  -  07-19 @ 07:00  --------------------------------------------------------  IN: 1700 mL / OUT: 1320 mL / NET: 380 mL      PHYSICAL EXAM:    General: Well developed; well nourished; in no acute distress  HEENT: MMM, conjunctiva and sclera clear  Gastrointestinal: Soft non-tender non-distended; Normal bowel sounds; No hepatosplenomegaly. No rebound or guarding  Skin: Warm and dry. No obvious rash    LABS:                        6.7    4.5   )-----------( 140      ( 19 Jul 2018 07:06 )             21.6     07-19    140  |  106  |  8   ----------------------------<  102<H>  3.2<L>   |  27  |  1.13    Ca    8.3<L>      19 Jul 2018 07:06  Mg     2.0     07-19                        RADIOLOGY & ADDITIONAL STUDIES:

## 2018-07-19 NOTE — PROGRESS NOTE ADULT - ASSESSMENT
73M with know diverticulosis and long term use of Steroids, presented with 3 days BRBPR, CT angio showed extensive colonic diverticulosis with no blush,   EGD and Cscope done yesterday, Shows old blood from mid transverse colon to sigmoid, no evidence of active bleeding, patient required 1 unit of pRBC after Cscope, Hb this morning is 6.8, patient denies any abdominal pain, Bloody BM.    Plan:  - Last Hb is 6.8, Please repeat CBC and transfuse as needed   - GI follow up  - Rest of plan by primary team  - Will reassess during the day  - Discussed with Dr.Andrews Huynh Abdallat  Gen Surg 73M with know diverticulosis and long term use of Steroids, presented with 3 days BRBPR, CT angio showed extensive colonic diverticulosis with no blush,   EGD and Cscope done yesterday, Shows old blood from mid transverse colon to sigmoid, no evidence of active bleeding, patient required 1 unit of pRBC after Cscope, Hb this morning is 6.8, patient denies any abdominal pain, Bloody BM.    Plan:  - Last Hb is 6.8, Please repeat CBC and transfuse as needed   - GI follow up  - Rest of plan by primary team  - Will reassess during the day  - Discussed with Dr.Andrews Huynh [facility]  Gen Surg    Update 12:00  Patient had 2 Hematochezia after he was seen in the morning, Hb Dropped to 6.7, He received 1 unit and is going for Abdomen CTA now, He is Hemodynamically stable, Abdomen is Soft,ND,NT.  JACQUELINE:  Skin tag, No fissures or hemorrhoids  Maroon blood, no masses or tenderness  T(C): 37.2   HR: 75   BP: 126/71   RR: 18   SpO2: 97%     Recommendation:  - Please Repeat CBC post transfusion, Trend CBC Q6h  - Transfuse if Hb<7  - Abdomen CTA (Will go for CTA now)  - GI Follow up for possible Cscope  - Keep IR on board  - Surgery will follow  - D/W Chief resident and the attending 73M with know diverticulosis and long term use of Steroids, presented with 3 days BRBPR, CT angio showed extensive colonic diverticulosis with no blush,   EGD and Cscope done yesterday, Shows old blood from mid transverse colon to sigmoid, no evidence of active bleeding, patient required 1 unit of pRBC after Cscope, Hb this morning is 6.8, patient denies any abdominal pain, Bloody BM.    Plan:  - Last Hb is 6.8, Please repeat CBC and transfuse as needed   - GI follow up  - Rest of plan by primary team  - Will reassess during the day  - Discussed with Dr.Andrews Huynh [facility]  Gen Surg    Update 12:00  Patient had 2 Hematochezia after he was seen in the morning, Hb Dropped to 6.7, He received 1 unit and is going for Abdomen CTA now, He is Hemodynamically stable, Abdomen is Soft,ND,NT.  JACQUELINE:  Skin tag, No fissures or hemorrhoids  Maroon blood, no masses or tenderness  T(C): 37.2   HR: 75   BP: 126/71   RR: 18   SpO2: 97%     Recommendation:  - Please Repeat CBC post transfusion, Trend CBC Q6h  - Transfuse if Hb<7  - Abdomen CTA (Will go for CTA now)  - GI Follow up for possible Cscope  - Keep IR on board  - Surgery will follow  - D/W Chief resident and the attending      4:48pm No active extravasation on CTA, Please consider Tagged RBC scan if bleeds again, Rest of plan as above.

## 2018-07-19 NOTE — PROGRESS NOTE ADULT - PROBLEM SELECTOR PLAN 2
Currently passing BRBPR this morning. VSS, asymptomatic. EGD/Colonoscopies performed 7/17/18 by GI team.  -EGD: small hiatal hernia, no active bleed, mild gastritis  -Colonoscopy: pan diverticulosis, source of bleed, no active bleeding, internal/external hemorrhoids

## 2018-07-19 NOTE — PROGRESS NOTE ADULT - SUBJECTIVE AND OBJECTIVE BOX
Subjective:  Doing well, afebrile, denies any abdominal pain, nausea or vomiting, having regular BM, without blood    Vital Signs Last 24 Hrs  T(C): 36.8 (19 Jul 2018 08:35), Max: 37 (19 Jul 2018 05:10)  T(F): 98.2 (19 Jul 2018 08:35), Max: 98.6 (19 Jul 2018 05:10)  HR: 74 (19 Jul 2018 08:35) (74 - 84)  BP: 125/66 (19 Jul 2018 08:35) (118/65 - 140/79)  BP(mean): 93 (18 Jul 2018 15:39) (93 - 93)  RR: 18 (19 Jul 2018 08:35) (17 - 19)  SpO2: 96% (19 Jul 2018 08:35) (95% - 99%)    Physical Exam:  General: NAD, resting comfortably in bed  Pulmonary: Nonlabored breathing, no respiratory distress  Cardiovascular: NSR  Abdominal: soft, NT/ND.        LABS:                        6.7    4.5   )-----------( 140      ( 19 Jul 2018 07:06 )             21.6     07-19    140  |  106  |  8   ----------------------------<  102<H>  3.2<L>   |  27  |  1.13    Ca    8.3<L>      19 Jul 2018 07:06  Mg     2.0     07-19        CAPILLARY BLOOD GLUCOSE

## 2018-07-19 NOTE — PROGRESS NOTE ADULT - ASSESSMENT
Assessment and plan: 73 year old M with PMH diverticulosis who presents with 3 days of hematochezia.   -s/p EGD and colonoscopy 7/17, EGD showed esophagitis s/p biopsies, colonoscopy showed multiple diverticula and old heme, no stigmata of active bleed  -h/h trended down this, getting one unit PRBC, vitals stable  -on a regular diet,   -trend h/h   -monitor vitals, monitor for further signs of bleeding  -goal transfusion hb<7  -will follow esophageal bx  -PPI once daily  -  -will follow up with his own gastroenterologist or the Tuesday am fellows clinic with Dr Willis in 6-8 weeks. Assessment and plan: 73 year old M with PMH diverticulosis who presents with 3 days of hematochezia.   -s/p EGD and colonoscopy 7/17, EGD showed esophagitis s/p biopsies, colonoscopy showed multiple diverticula and old heme, no stigmata of active bleed  -h/h trended down this, getting one unit PRBC, vitals stable  -get CTA stat   -Get IR on board  -follow up with general surgery  -trend h/h   -monitor vitals, monitor for further signs of bleeding  -goal transfusion hb<7 Assessment and plan: 73 year old M with PMH diverticulosis who presents with 3 days of hematochezia.   -s/p EGD and colonoscopy 7/17, EGD showed esophagitis s/p biopsies, colonoscopy showed multiple diverticula and old heme, no stigmata of active bleed  -h/h trended down this am, getting one unit PRBC, vitals stable  -get CTA stat   -npo  -Get IR on board  -follow up with general surgery  -trend h/h   -monitor vitals, monitor for further signs of bleeding  -goal transfusion hb<7      Discussed with attending Dr Willis

## 2018-07-19 NOTE — PROGRESS NOTE ADULT - PROBLEM SELECTOR PLAN 5
Unknown baseline creatinine. Cr 1.31 on admission, now improving.  - F/u creatinine daily  - s/p 1L NS in ED, Cr elevation may be prerenal in setting of GIB  - will inquire regarding baseline creatinine from PMD Dr. Mcneill on [facility], [phone]

## 2018-07-19 NOTE — PROGRESS NOTE ADULT - PROBLEM SELECTOR PLAN 1
Acute blood loss anemia 2/2 to LGIB:  -currently being given 1 pRBC  - as per GI, pt will be going  CT angio  -IR made aware for potential embolization procedure  -f/u surgery regarding possible transfer   -s/p EGD and colonoscopy (7/18)  - Hgb 6.7 today<- 8 yesterday s/p 1 pRBC last night  - No known cardiac history, if need to transfuse, keep Hb goal >7  - Protonix 40mg IV BID transitioned to protonix 40mg PO daily  - c/w cbc monitoring  - c/w H/H, hemodynamic instability Acute blood loss anemia 2/2 to LGIB:  -currently being given 1 pRBC  - as per GI, pt will be going  CT angio  -IR made aware for potential embolization procedure  -general surgery following   -s/p EGD and colonoscopy (7/18)  - Hgb 6.7 today<- 8 yesterday s/p 1 pRBC last night  - No known cardiac history, if need to transfuse, keep Hb goal >7  - Protonix 40mg IV BID transitioned to protonix 40mg PO daily  - c/w cbc monitoring  - c/w H/H, hemodynamic instability

## 2018-07-19 NOTE — PROGRESS NOTE ADULT - SUBJECTIVE AND OBJECTIVE BOX
INTERVAL HPI/OVERNIGHT EVENTS:    EDELMIRA overnight. Pt states that he did not have any bloody bowel movement and feels better than the previous days.   This morning, however, intern was made aware of morning Hgb was 6.7. Spoke to patient, and he agreed to another 1pRBC transfusion. Patient is currently asymptomatic   Denies any brbpr, vomiting, dizziness, palpitations, headache, fevers, chills, abdominal pain, change in urinary or bowel habits.     Intern will follow closely.           VITAL SIGNS:  T(F): 98.6 (07-19-18 @ 05:10)  HR: 75 (07-19-18 @ 05:10)  BP: 125/71 (07-19-18 @ 05:10)  RR: 17 (07-19-18 @ 05:10)  SpO2: 98% (07-19-18 @ 05:10)  Wt(kg): --    PHYSICAL EXAM:  Constitutional:  male, pleasant, NAD, comfortable  HEENT: sclera nonicteric  Respiratory: CTAB  Cardiovascular: normal s1s2, no m/r/g  Gastrointestinal: soft, NTND, non distended, normoactive bowel sounds, nontympanic  Extremities: Warm, well perfused, +1 pitting edema ankles bilaterally  Neurological: aa0x3  Skin: warm, dry, cap refill + 2            MEDICATIONS  (STANDING):  levETIRAcetam 1000 milliGRAM(s) Oral two times a day  pantoprazole    Tablet 40 milliGRAM(s) Oral before breakfast  predniSONE   Tablet 20 milliGRAM(s) Oral daily  sodium chloride 0.45%. 1000 milliLiter(s) (100 mL/Hr) IV Continuous <Continuous>    MEDICATIONS  (PRN):      Allergies    No Known Allergies    Intolerances        LABS:                        6.7    4.5   )-----------( 140      ( 19 Jul 2018 07:06 )             21.6     07-19    140  |  106  |  8   ----------------------------<  102<H>  3.2<L>   |  27  |  1.13    Ca    8.3<L>      19 Jul 2018 07:06  Mg     2.0     07-19            RADIOLOGY & ADDITIONAL TESTS:  Reviewed INTERVAL HPI/OVERNIGHT EVENTS:    EDELMIRA overnight. Pt states that he did not have any bowel movement and feels better than the previous days.   This morning, however, intern was made aware of morning Hgb was 6.7. Spoke to patient, and he agreed to another 1pRBC transfusion. Patient is currently asymptomatic   Denies any brbpr, vomiting, dizziness, palpitations, headache, fevers, chills, abdominal pain, change in urinary or bowel habits.     Intern will follow closely.           VITAL SIGNS:  T(F): 98.6 (07-19-18 @ 05:10)  HR: 75 (07-19-18 @ 05:10)  BP: 125/71 (07-19-18 @ 05:10)  RR: 17 (07-19-18 @ 05:10)  SpO2: 98% (07-19-18 @ 05:10)  Wt(kg): --    PHYSICAL EXAM:  Constitutional:  male, pleasant, NAD, comfortable  HEENT: sclera nonicteric  Respiratory: CTAB  Cardiovascular: normal s1s2, no m/r/g  Gastrointestinal: soft, NTND, non distended, normoactive bowel sounds, nontympanic  Extremities: Warm, well perfused, +1 pitting edema ankles bilaterally  Neurological: aa0x3  Skin: warm, dry, cap refill + 2            MEDICATIONS  (STANDING):  levETIRAcetam 1000 milliGRAM(s) Oral two times a day  pantoprazole    Tablet 40 milliGRAM(s) Oral before breakfast  predniSONE   Tablet 20 milliGRAM(s) Oral daily  sodium chloride 0.45%. 1000 milliLiter(s) (100 mL/Hr) IV Continuous <Continuous>    MEDICATIONS  (PRN):      Allergies    No Known Allergies    Intolerances        LABS:                        6.7    4.5   )-----------( 140      ( 19 Jul 2018 07:06 )             21.6     07-19    140  |  106  |  8   ----------------------------<  102<H>  3.2<L>   |  27  |  1.13    Ca    8.3<L>      19 Jul 2018 07:06  Mg     2.0     07-19            RADIOLOGY & ADDITIONAL TESTS:  Reviewed INTERVAL HPI/OVERNIGHT EVENTS:    EDELMIRA overnight. Pt states that he did not have any bowel movement and feels better than the previous days.   This morning, however, intern was made aware of morning Hgb was 6.7. Spoke to patient, and he agreed to another 1pRBC transfusion. Patient is currently asymptomatic   Denies any  vomiting, dizziness, palpitations, headache, fevers, chills, abdominal pain, change in urinary or bowel habits.     Intern will follow closely.     Update: 9am, pt had bright red blood. Intern notified.        VITAL SIGNS:  T(F): 98.6 (07-19-18 @ 05:10)  HR: 75 (07-19-18 @ 05:10)  BP: 125/71 (07-19-18 @ 05:10)  RR: 17 (07-19-18 @ 05:10)  SpO2: 98% (07-19-18 @ 05:10)  Wt(kg): --    PHYSICAL EXAM:  Constitutional:  male, pleasant, NAD, comfortable  HEENT: sclera nonicteric  Respiratory: CTAB  Cardiovascular: normal s1s2, no m/r/g  Gastrointestinal: soft, NTND, non distended, normoactive bowel sounds, nontympanic  Extremities: Warm, well perfused, +1 pitting edema ankles bilaterally  Neurological: aa0x3  Skin: warm, dry, cap refill + 2            MEDICATIONS  (STANDING):  levETIRAcetam 1000 milliGRAM(s) Oral two times a day  pantoprazole    Tablet 40 milliGRAM(s) Oral before breakfast  predniSONE   Tablet 20 milliGRAM(s) Oral daily  sodium chloride 0.45%. 1000 milliLiter(s) (100 mL/Hr) IV Continuous <Continuous>    MEDICATIONS  (PRN):      Allergies    No Known Allergies    Intolerances        LABS:                        6.7    4.5   )-----------( 140      ( 19 Jul 2018 07:06 )             21.6     07-19    140  |  106  |  8   ----------------------------<  102<H>  3.2<L>   |  27  |  1.13    Ca    8.3<L>      19 Jul 2018 07:06  Mg     2.0     07-19            RADIOLOGY & ADDITIONAL TESTS:  Reviewed INTERVAL HPI/OVERNIGHT EVENTS:    EDELMIRA overnight. Pt states that he did not have any bowel movement and feels better than the previous days.   This morning, however, intern was made aware of morning Hgb was 6.7. Spoke to patient, and he agreed to another 1pRBC transfusion. Patient is currently asymptomatic   Denies any  vomiting, dizziness, palpitations, headache, fevers, chills, abdominal pain, change in urinary or bowel habits.     Intern will follow closely.     Update: 9am, pt had 2 episodes of bright red bowel movements. Intern notified.        VITAL SIGNS:  T(F): 98.6 (07-19-18 @ 05:10)  HR: 75 (07-19-18 @ 05:10)  BP: 125/71 (07-19-18 @ 05:10)  RR: 17 (07-19-18 @ 05:10)  SpO2: 98% (07-19-18 @ 05:10)  Wt(kg): --    PHYSICAL EXAM:  Constitutional:  male, pleasant, NAD, comfortable  HEENT: sclera nonicteric  Respiratory: CTAB  Cardiovascular: normal s1s2, no m/r/g  Gastrointestinal: soft, NTND, non distended, normoactive bowel sounds, nontympanic  Extremities: Warm, well perfused, +1 pitting edema ankles bilaterally  Neurological: aa0x3  Skin: warm, dry, cap refill + 2            MEDICATIONS  (STANDING):  levETIRAcetam 1000 milliGRAM(s) Oral two times a day  pantoprazole    Tablet 40 milliGRAM(s) Oral before breakfast  predniSONE   Tablet 20 milliGRAM(s) Oral daily  sodium chloride 0.45%. 1000 milliLiter(s) (100 mL/Hr) IV Continuous <Continuous>    MEDICATIONS  (PRN):      Allergies    No Known Allergies    Intolerances        LABS:                        6.7    4.5   )-----------( 140      ( 19 Jul 2018 07:06 )             21.6     07-19    140  |  106  |  8   ----------------------------<  102<H>  3.2<L>   |  27  |  1.13    Ca    8.3<L>      19 Jul 2018 07:06  Mg     2.0     07-19            RADIOLOGY & ADDITIONAL TESTS:  Reviewed

## 2018-07-19 NOTE — PROGRESS NOTE ADULT - PROBLEM SELECTOR PLAN 4
Hyponatremia 2/2 volume loss in the setting of BRBPR  -Switched fluids to NaCl 0.45% in the setting of hypernatrema  -currently asymptomatic  -s/p 2L ED bolus in ED  -c/w monitor BMP

## 2018-07-20 VITALS
RESPIRATION RATE: 18 BRPM | HEART RATE: 82 BPM | SYSTOLIC BLOOD PRESSURE: 125 MMHG | TEMPERATURE: 99 F | OXYGEN SATURATION: 96 % | DIASTOLIC BLOOD PRESSURE: 81 MMHG

## 2018-07-20 PROBLEM — K57.90 DIVERTICULOSIS OF INTESTINE, PART UNSPECIFIED, WITHOUT PERFORATION OR ABSCESS WITHOUT BLEEDING: Chronic | Status: ACTIVE | Noted: 2018-07-15

## 2018-07-20 PROBLEM — Z00.00 ENCOUNTER FOR PREVENTIVE HEALTH EXAMINATION: Status: ACTIVE | Noted: 2018-07-20

## 2018-07-20 LAB
HCT VFR BLD CALC: 25.2 % — LOW (ref 39–50)
HGB BLD-MCNC: 8 G/DL — LOW (ref 13–17)
MCHC RBC-ENTMCNC: 29.3 PG — SIGNIFICANT CHANGE UP (ref 27–34)
MCHC RBC-ENTMCNC: 31.7 G/DL — LOW (ref 32–36)
MCV RBC AUTO: 92.3 FL — SIGNIFICANT CHANGE UP (ref 80–100)
PLATELET # BLD AUTO: 162 K/UL — SIGNIFICANT CHANGE UP (ref 150–400)
RBC # BLD: 2.73 M/UL — LOW (ref 4.2–5.8)
RBC # FLD: 16.1 % — SIGNIFICANT CHANGE UP (ref 10.3–16.9)
WBC # BLD: 5 K/UL — SIGNIFICANT CHANGE UP (ref 3.8–10.5)
WBC # FLD AUTO: 5 K/UL — SIGNIFICANT CHANGE UP (ref 3.8–10.5)

## 2018-07-20 PROCEDURE — 86923 COMPATIBILITY TEST ELECTRIC: CPT

## 2018-07-20 PROCEDURE — 81003 URINALYSIS AUTO W/O SCOPE: CPT

## 2018-07-20 PROCEDURE — 99239 HOSP IP/OBS DSCHRG MGMT >30: CPT

## 2018-07-20 PROCEDURE — 83735 ASSAY OF MAGNESIUM: CPT

## 2018-07-20 PROCEDURE — 88305 TISSUE EXAM BY PATHOLOGIST: CPT

## 2018-07-20 PROCEDURE — 85730 THROMBOPLASTIN TIME PARTIAL: CPT

## 2018-07-20 PROCEDURE — 96374 THER/PROPH/DIAG INJ IV PUSH: CPT | Mod: XU

## 2018-07-20 PROCEDURE — 36430 TRANSFUSION BLD/BLD COMPNT: CPT

## 2018-07-20 PROCEDURE — 85610 PROTHROMBIN TIME: CPT

## 2018-07-20 PROCEDURE — 85027 COMPLETE CBC AUTOMATED: CPT

## 2018-07-20 PROCEDURE — 83690 ASSAY OF LIPASE: CPT

## 2018-07-20 PROCEDURE — 86901 BLOOD TYPING SEROLOGIC RH(D): CPT

## 2018-07-20 PROCEDURE — 85025 COMPLETE CBC W/AUTO DIFF WBC: CPT

## 2018-07-20 PROCEDURE — 86850 RBC ANTIBODY SCREEN: CPT

## 2018-07-20 PROCEDURE — 80048 BASIC METABOLIC PNL TOTAL CA: CPT

## 2018-07-20 PROCEDURE — 86900 BLOOD TYPING SEROLOGIC ABO: CPT

## 2018-07-20 PROCEDURE — 99285 EMERGENCY DEPT VISIT HI MDM: CPT | Mod: 25

## 2018-07-20 PROCEDURE — 74174 CTA ABD&PLVS W/CONTRAST: CPT

## 2018-07-20 PROCEDURE — 36415 COLL VENOUS BLD VENIPUNCTURE: CPT

## 2018-07-20 PROCEDURE — 80053 COMPREHEN METABOLIC PANEL: CPT

## 2018-07-20 PROCEDURE — P9016: CPT

## 2018-07-20 RX ORDER — PANTOPRAZOLE SODIUM 20 MG/1
1 TABLET, DELAYED RELEASE ORAL
Qty: 60 | Refills: 0 | OUTPATIENT
Start: 2018-07-20 | End: 2018-09-17

## 2018-07-20 RX ORDER — PANTOPRAZOLE SODIUM 20 MG/1
1 TABLET, DELAYED RELEASE ORAL
Qty: 0 | Refills: 0 | COMMUNITY
Start: 2018-07-20

## 2018-07-20 RX ADMIN — PANTOPRAZOLE SODIUM 40 MILLIGRAM(S): 20 TABLET, DELAYED RELEASE ORAL at 05:35

## 2018-07-20 RX ADMIN — LEVETIRACETAM 1000 MILLIGRAM(S): 250 TABLET, FILM COATED ORAL at 05:34

## 2018-07-20 RX ADMIN — Medication 20 MILLIGRAM(S): at 05:34

## 2018-07-20 NOTE — PROGRESS NOTE ADULT - ASSESSMENT
73 year old M with PMH diverticulosis presented with 3 days of BRBPR, admitted for investigation for hematochezia. Currently hemodynamically stable, Hb 12.2 on admission. Pt s/p EGD and colonoscopy and s/p 1pRBC now with Hgb 6.7 this morning, being given 1 unit pRBC 73 year old M with PMH diverticulosis presented with 3 days of BRBPR, admitted for investigation for hematochezia. Currently hemodynamically stable, Hb 12.2 on admission. Pt s/p EGD and colonoscopy Hgb 8 this morning s/p 2pRBCs.

## 2018-07-20 NOTE — PROGRESS NOTE ADULT - PROBLEM SELECTOR PLAN 6
- positive orthostatics this morning  - c/w monitoring for any signs of instability ( palpitations, hypotension)   -may need telemetry if persisting and/or symptomatic
- positive orthostatics this morning  - c/w monitoring for any signs of instability ( palpitations, hypotension)   -may need telemetry if persisting and/or symptomatic
- positive orthostatics this morning  - c/w monitoring for any signs of instabiltiy ( palpitations, hypotension)   -may need telemetry if persisting and/or symptomatic
- positive orthostatics this morning  - c/w monitoring for any signs of instabiltiy ( palpitations, hypotension)   -may need telemetry if persisting and/or symptomatic
- positive orthostatics this morning  - c/w monitoring for any signs of instability ( palpitations, hypotension)   -may need telemetry if persisting and/or symptomatic

## 2018-07-20 NOTE — PROGRESS NOTE ADULT - PROBLEM SELECTOR PROBLEM 8
Seizure-like activity

## 2018-07-20 NOTE — PROGRESS NOTE ADULT - PROBLEM SELECTOR PLAN 7
monitor cbc, check iron studies

## 2018-07-20 NOTE — PROGRESS NOTE ADULT - PROBLEM SELECTOR PLAN 10
1) PCP: Dr. Gayathri Mora on [facility], [phone]  Contacted physician: Pt been on keppra for years for unspecified seizure disorder and prednisone for rheumatoid arthritis.   2) Date of Contact with PCP: 7/19  3) PCP Contacted at Discharge: (Y/N):   4) Summary of Handoff Given to PCP: Intern spoke to PCP and gave information regarding the status of Mr. Zimmerman.  5) Post-Discharge Appointment Date and Location: [facility] 1) PCP: Dr. Gayathri Mora on [facility], [phone]  Contacted physician: Pt been on keppra for years for unspecified seizure disorder and prednisone for rheumatoid arthritis.   2) Date of Contact with PCP: 7/19  3) PCP Contacted at Discharge: (Y/N): Yes, appointment made for 7/25  4) Summary of Handoff Given to PCP: Intern spoke to PCP and gave information regarding the status of Mr. Zimmerman.  5) Post-Discharge Appointment Date and Location:  Yes, appointment made for 7/25

## 2018-07-20 NOTE — PROGRESS NOTE ADULT - ASSESSMENT
Assessment and plan: 73 year old M with PMH diverticulosis who presented with hematochezia.   -s/p EGD and colonoscopy 7/17, EGD showed esophagitis s/p biopsies, colonoscopy showed multiple diverticula and old heme, no stigmata of active bleed  -h/h stable at 8/25.2 today, vitals stable  -CTA did not show any evidence of active bleeding  -tolerating clears well  -general surgery following  -trend h/h   -monitor vitals, monitor for further signs of bleeding  -goal transfusion hb<7  -if further episodes of bleeding consider RBC scan  -if no further bloody BM's and if H/H continues to remain stable advance diet as tolerated

## 2018-07-20 NOTE — PROGRESS NOTE ADULT - SUBJECTIVE AND OBJECTIVE BOX
Pt seen and examined at bedside.    PERTINENT REVIEW OF SYSTEMS:  CONSTITUTIONAL: No weakness, fevers or chills  HEENT: No visual changes; No vertigo or throat pain   GASTROINTESTINAL: No abdominal or epigastric pain. No nausea, vomiting, or hematemesis; No diarrhea or constipation. No melena or hematochezia.  NEUROLOGICAL: No numbness or weakness  SKIN: No itching, burning, rashes, or lesions     Allergies    No Known Allergies    Intolerances      MEDICATIONS:  MEDICATIONS  (STANDING):  levETIRAcetam 1000 milliGRAM(s) Oral two times a day  pantoprazole    Tablet 40 milliGRAM(s) Oral before breakfast  predniSONE   Tablet 20 milliGRAM(s) Oral daily    MEDICATIONS  (PRN):    Vital Signs Last 24 Hrs  T(C): 36.7 (20 Jul 2018 05:35), Max: 37.2 (19 Jul 2018 11:10)  T(F): 98.1 (20 Jul 2018 05:35), Max: 99 (19 Jul 2018 11:10)  HR: 73 (20 Jul 2018 05:35) (71 - 79)  BP: 114/68 (20 Jul 2018 05:35) (114/68 - 137/71)  BP(mean): --  RR: 17 (20 Jul 2018 05:35) (17 - 18)  SpO2: 98% (20 Jul 2018 05:35) (96% - 99%)    PHYSICAL EXAM:    General: Well developed; well nourished; in no acute distress  HEENT: MMM, conjunctiva and sclera clear  Gastrointestinal: Soft non-tender non-distended; Normal bowel sounds; No hepatosplenomegaly. No rebound or guarding  Skin: Warm and dry. No obvious rash    LABS:                        8.0    5.0   )-----------( 162      ( 20 Jul 2018 06:50 )             25.2     07-19    136  |  98  |  7   ----------------------------<  132<H>  4.0   |  29  |  1.08    Ca    8.9      19 Jul 2018 16:35  Mg     2.0     07-19                        RADIOLOGY & ADDITIONAL STUDIES: Pt seen and examined at bedside. Denies any BM since yesterday, no bleeding per rectum    PERTINENT REVIEW OF SYSTEMS:  CONSTITUTIONAL: No weakness, fevers or chills  HEENT: No visual changes; No vertigo or throat pain   GASTROINTESTINAL: No abdominal or epigastric pain. No nausea, vomiting, or hematemesis; No diarrhea or constipation  NEUROLOGICAL: No numbness or weakness  SKIN: No itching, burning, rashes, or lesions     Allergies    No Known Allergies    Intolerances      MEDICATIONS:  MEDICATIONS  (STANDING):  levETIRAcetam 1000 milliGRAM(s) Oral two times a day  pantoprazole    Tablet 40 milliGRAM(s) Oral before breakfast  predniSONE   Tablet 20 milliGRAM(s) Oral daily    MEDICATIONS  (PRN):    Vital Signs Last 24 Hrs  T(C): 36.7 (20 Jul 2018 05:35), Max: 37.2 (19 Jul 2018 11:10)  T(F): 98.1 (20 Jul 2018 05:35), Max: 99 (19 Jul 2018 11:10)  HR: 73 (20 Jul 2018 05:35) (71 - 79)  BP: 114/68 (20 Jul 2018 05:35) (114/68 - 137/71)  BP(mean): --  RR: 17 (20 Jul 2018 05:35) (17 - 18)  SpO2: 98% (20 Jul 2018 05:35) (96% - 99%)    PHYSICAL EXAM:    General: Well developed; well nourished; in no acute distress  HEENT: MMM, conjunctiva and sclera clear  Gastrointestinal: Soft non-tender non-distended; Normal bowel sounds; No hepatosplenomegaly. No rebound or guarding  Skin: Warm and dry. No obvious rash    LABS:                        8.0    5.0   )-----------( 162      ( 20 Jul 2018 06:50 )             25.2     07-19    136  |  98  |  7   ----------------------------<  132<H>  4.0   |  29  |  1.08    Ca    8.9      19 Jul 2018 16:35  Mg     2.0     07-19                        RADIOLOGY & ADDITIONAL STUDIES:

## 2018-07-20 NOTE — PROGRESS NOTE ADULT - PROBLEM SELECTOR PLAN 1
Acute blood loss anemia 2/2 to LGIB:  -currently being given 1 pRBC  - as per GI, pt will be going  CT angio  -IR made aware for potential embolization procedure  -general surgery following   -s/p EGD and colonoscopy (7/18)  - Hgb 6.7 today<- 8 yesterday s/p 1 pRBC last night  - No known cardiac history, if need to transfuse, keep Hb goal >7  - Protonix 40mg IV BID transitioned to protonix 40mg PO daily  - c/w cbc monitoring  - c/w H/H, hemodynamic instability Acute blood loss anemia 2/2 to LGIB:  -Hgb 8 today (stable, asymptomatic) today  -s/p 2 pRBCs  -CT angio (7/19): extensive colonic diverticulosis with no active bleeding  -general surgery following   -s/p EGD and colonoscopy (7/18)  - No known cardiac history, if need to transfuse, keep Hb goal >7  - Protonix 40mg IV BID transitioned to protonix 40mg PO daily  -if further bleed, consider tagged red blood cell scan  -will advance diet to full liquids  - c/w cbc monitoring  - c/w H/H, hemodynamic instability

## 2018-07-20 NOTE — PROGRESS NOTE ADULT - ATTENDING COMMENTS
DX  HEMATOCHEZIA - suspect diverticular bleed. trend cbc. GI following --- planning egd/c-scope. transfuse to keep hb >7  BLOOD LOSS ANEMIA- due to gi bleeding. serial cbcs. as above  ORTHOSTATIC HYPOTENSION - cont ivfs. asymptomatic currently.   JOSE ANTONIO- resolved w/ ivfs.
DX  HEMATOCHEZIA -RESOLVED. suspect diverticular bleed. trend cbc. s/p prbc x 2 u.  c-scope w/o evidence of bleeding.   CTA a/p w/o e/o bleeding. hb remains stable ~8  BLOOD LOSS ANEMIA- due to gi bleeding. serial cbcs. as above  ORTHOSTATIC HYPOTENSION - resolved.    JOSE ANTONIO- resolved
DX:    HEMATOCHEZIA -pt with ongoing bloody stool today.  suspect diverticular bleed. trend cbc. s/p prbc x 2 u.  c-scope w/o evidence of bleeding. for urgent CTA a/p. surgery  following.  transfuse to keep hb >7  BLOOD LOSS ANEMIA- due to gi bleeding. serial cbcs. as above  ORTHOSTATIC HYPOTENSION - cont ivfs. asymptomatic currently.   JOSE ANTONIO- resolved w/ ivf
DX  HEMATOCHEZIA - suspect diverticular bleed. trend cbc. GI following --- planning egd/c-scope. transfuse to keep hb >7  BLOOD LOSS ANEMIA- due to gi bleeding. serial cbc. as above  ORTHOSTATIC HYPONTESION - cont ivfs. asymptomatic currently.   JOSE ANTONIO- resolved w/ ivfs.

## 2018-07-20 NOTE — PROGRESS NOTE ADULT - SUBJECTIVE AND OBJECTIVE BOX
Subjective:  Doing well, had 2 BM yesterday PM with streaks of blood, denies any abdominal pain, nausea or vomiting.      Vital Signs Last 24 Hrs  T(C): 37.1 (20 Jul 2018 08:51), Max: 37.2 (19 Jul 2018 11:10)  T(F): 98.8 (20 Jul 2018 08:51), Max: 99 (19 Jul 2018 11:10)  HR: 82 (20 Jul 2018 08:51) (71 - 82)  BP: 125/81 (20 Jul 2018 08:51) (114/68 - 137/71)  BP(mean): --  RR: 18 (20 Jul 2018 08:51) (17 - 18)  SpO2: 96% (20 Jul 2018 08:51) (96% - 99%)    Physical Exam:  General: NAD, resting comfortably in bed  Pulmonary: Nonlabored breathing, no respiratory distress  Abdominal: soft, NT/ND,       LABS:                        8.0    5.0   )-----------( 162      ( 20 Jul 2018 06:50 )             25.2     07-19    136  |  98  |  7   ----------------------------<  132<H>  4.0   |  29  |  1.08    Ca    8.9      19 Jul 2018 16:35  Mg     2.0     07-19        CAPILLARY BLOOD GLUCOSE

## 2018-07-20 NOTE — PROGRESS NOTE ADULT - PROVIDER SPECIALTY LIST ADULT
Gastroenterology
Internal Medicine
Surgery
Internal Medicine

## 2018-07-20 NOTE — PROGRESS NOTE ADULT - PROBLEM SELECTOR PLAN 9
Ppx protonix, no SQH in setting of bleed  F: s/p 2L NS, 100cc/hr NS  E: replete if K<4 Mg<2  N: NPO in setting of possible colonoscopy  C: Full code  Dispo: MEGAN

## 2018-07-20 NOTE — PROGRESS NOTE ADULT - ASSESSMENT
73M with know diverticulosis and long term use of Steroids, presented with 3 days BRBPR, CT angio showed extensive colonic diverticulosis with no blush,   EGD and Cscope done, Shows old blood from mid transverse colon to sigmoid, no evidence of active bleeding, patient required 1 unit of pRBC after Cscope.    Reported 2 bloody BM yesterday morning, Hb dropped to 6.7, received 1 unit of pRBC and Hb has been stable around 8 since, repeat CTA abdomen didn't show any active bleeding, patient had 2 more BM with blood streaks in BM.    Plan:  - No acute Surgical intervention  - Cont to trend CBC, Q6h   - Tagged RBC scan if rebleed  - Follow up with GI  - Rest of plan by primary team  - Will follow up later today  - Will Discuss with the attending    Krista Wood

## 2018-07-20 NOTE — PROGRESS NOTE ADULT - PROBLEM SELECTOR PLAN 5
Unknown baseline creatinine. Cr 1.31 on admission, now improving.  - F/u creatinine daily  - s/p 1L NS in ED, Cr elevation may be prerenal in setting of GIB  - will inquire regarding baseline creatinine from PMD Dr. Mcneill on [facility], [phone]

## 2018-07-20 NOTE — PROGRESS NOTE ADULT - SUBJECTIVE AND OBJECTIVE BOX
INTERVAL HPI/OVERNIGHT EVENTS:  Patient was seen and examined at bedside. Pt states he had 2 small bowel movements yesterday and each time there were small streaks of blood on tissue paper but no passage of BRBPR. Otherwise, pt was comfortable overnight and no further complaints. He is on a clear liquid diet. Denies dizziness, palpitations, fevers, chest pain, nausea, vomiting.     VITAL SIGNS:  T(F): 98.1 (07-20-18 @ 05:35)  HR: 73 (07-20-18 @ 05:35)  BP: 114/68 (07-20-18 @ 05:35)  RR: 17 (07-20-18 @ 05:35)  SpO2: 98% (07-20-18 @ 05:35)  Wt(kg): --    PHYSICAL EXAM:    Constitutional:  male, pleasant, NAD, comfortable  HEENT: sclera nonicteric  Respiratory: CTAB  Cardiovascular: normal s1s2, no m/r/g  Gastrointestinal: soft, NTND, non distended, normoactive bowel sounds, nontympanic  Extremities: Warm, well perfused, +1 pitting edema ankles bilaterally  Neurological: aa0x3  Skin: warm, dry, cap refill + 2    MEDICATIONS  (STANDING):  levETIRAcetam 1000 milliGRAM(s) Oral two times a day  pantoprazole    Tablet 40 milliGRAM(s) Oral before breakfast  predniSONE   Tablet 20 milliGRAM(s) Oral daily    MEDICATIONS  (PRN):      Allergies    No Known Allergies    Intolerances        LABS:                        8.0    5.0   )-----------( 162      ( 20 Jul 2018 06:50 )             25.2     07-19    136  |  98  |  7   ----------------------------<  132<H>  4.0   |  29  |  1.08    Ca    8.9      19 Jul 2018 16:35  Mg     2.0     07-19            RADIOLOGY & ADDITIONAL TESTS:  Reviewed

## 2018-07-25 DIAGNOSIS — I95.1 ORTHOSTATIC HYPOTENSION: ICD-10-CM

## 2018-07-25 DIAGNOSIS — Z79.52 LONG TERM (CURRENT) USE OF SYSTEMIC STEROIDS: ICD-10-CM

## 2018-07-25 DIAGNOSIS — N17.9 ACUTE KIDNEY FAILURE, UNSPECIFIED: ICD-10-CM

## 2018-07-25 DIAGNOSIS — K57.31 DIVERTICULOSIS OF LARGE INTESTINE WITHOUT PERFORATION OR ABSCESS WITH BLEEDING: ICD-10-CM

## 2018-07-25 DIAGNOSIS — K64.8 OTHER HEMORRHOIDS: ICD-10-CM

## 2018-07-25 DIAGNOSIS — E11.9 TYPE 2 DIABETES MELLITUS WITHOUT COMPLICATIONS: ICD-10-CM

## 2018-07-25 DIAGNOSIS — Z87.891 PERSONAL HISTORY OF NICOTINE DEPENDENCE: ICD-10-CM

## 2018-07-25 DIAGNOSIS — E87.6 HYPOKALEMIA: ICD-10-CM

## 2018-07-25 DIAGNOSIS — K64.4 RESIDUAL HEMORRHOIDAL SKIN TAGS: ICD-10-CM

## 2018-07-25 DIAGNOSIS — D62 ACUTE POSTHEMORRHAGIC ANEMIA: ICD-10-CM

## 2018-07-25 DIAGNOSIS — K21.0 GASTRO-ESOPHAGEAL REFLUX DISEASE WITH ESOPHAGITIS: ICD-10-CM

## 2018-07-25 DIAGNOSIS — G40.909 EPILEPSY, UNSPECIFIED, NOT INTRACTABLE, WITHOUT STATUS EPILEPTICUS: ICD-10-CM

## 2018-07-25 DIAGNOSIS — E87.0 HYPEROSMOLALITY AND HYPERNATREMIA: ICD-10-CM

## 2018-07-25 DIAGNOSIS — M06.9 RHEUMATOID ARTHRITIS, UNSPECIFIED: ICD-10-CM

## 2018-07-25 DIAGNOSIS — K29.70 GASTRITIS, UNSPECIFIED, WITHOUT BLEEDING: ICD-10-CM

## 2018-07-25 DIAGNOSIS — K92.1 MELENA: ICD-10-CM

## 2018-07-25 DIAGNOSIS — K44.9 DIAPHRAGMATIC HERNIA WITHOUT OBSTRUCTION OR GANGRENE: ICD-10-CM

## 2018-08-20 ENCOUNTER — APPOINTMENT (OUTPATIENT)
Dept: GASTROENTEROLOGY | Facility: CLINIC | Age: 73
End: 2018-08-20

## 2018-10-04 ENCOUNTER — TRANSCRIPTION ENCOUNTER (OUTPATIENT)
Age: 73
End: 2018-10-04

## 2018-10-05 ENCOUNTER — EMERGENCY (EMERGENCY)
Facility: HOSPITAL | Age: 73
LOS: 1 days | Discharge: HOME | End: 2018-10-05
Attending: EMERGENCY MEDICINE | Admitting: EMERGENCY MEDICINE
Payer: MEDICARE

## 2018-10-05 VITALS
SYSTOLIC BLOOD PRESSURE: 148 MMHG | TEMPERATURE: 98 F | HEIGHT: 73 IN | WEIGHT: 186.07 LBS | OXYGEN SATURATION: 98 % | HEART RATE: 60 BPM | RESPIRATION RATE: 18 BRPM | DIASTOLIC BLOOD PRESSURE: 81 MMHG

## 2018-10-05 DIAGNOSIS — Z88.8 ALLERGY STATUS TO OTHER DRUGS, MEDICAMENTS AND BIOLOGICAL SUBSTANCES: ICD-10-CM

## 2018-10-05 DIAGNOSIS — Z79.52 LONG TERM (CURRENT) USE OF SYSTEMIC STEROIDS: ICD-10-CM

## 2018-10-05 DIAGNOSIS — Z79.899 OTHER LONG TERM (CURRENT) DRUG THERAPY: ICD-10-CM

## 2018-10-05 DIAGNOSIS — R30.0 DYSURIA: ICD-10-CM

## 2018-10-05 DIAGNOSIS — R31.9 HEMATURIA, UNSPECIFIED: ICD-10-CM

## 2018-10-05 LAB
ALBUMIN SERPL ELPH-MCNC: 4 G/DL — SIGNIFICANT CHANGE UP (ref 3.3–5)
ALP SERPL-CCNC: 46 U/L — SIGNIFICANT CHANGE UP (ref 40–120)
ALT FLD-CCNC: 24 U/L — SIGNIFICANT CHANGE UP (ref 10–45)
ANION GAP SERPL CALC-SCNC: 14 MMOL/L — SIGNIFICANT CHANGE UP (ref 5–17)
APPEARANCE UR: CLEAR — SIGNIFICANT CHANGE UP
APTT BLD: 25.4 SEC — LOW (ref 27.5–37.4)
AST SERPL-CCNC: 23 U/L — SIGNIFICANT CHANGE UP (ref 10–40)
BILIRUB SERPL-MCNC: 0.3 MG/DL — SIGNIFICANT CHANGE UP (ref 0.2–1.2)
BILIRUB UR-MCNC: NEGATIVE — SIGNIFICANT CHANGE UP
BUN SERPL-MCNC: 21 MG/DL — SIGNIFICANT CHANGE UP (ref 7–23)
CALCIUM SERPL-MCNC: 9.2 MG/DL — SIGNIFICANT CHANGE UP (ref 8.4–10.5)
CHLORIDE SERPL-SCNC: 99 MMOL/L — SIGNIFICANT CHANGE UP (ref 96–108)
CO2 SERPL-SCNC: 28 MMOL/L — SIGNIFICANT CHANGE UP (ref 22–31)
COLOR SPEC: YELLOW — SIGNIFICANT CHANGE UP
CREAT SERPL-MCNC: 1.28 MG/DL — SIGNIFICANT CHANGE UP (ref 0.5–1.3)
DIFF PNL FLD: ABNORMAL
GLUCOSE SERPL-MCNC: 164 MG/DL — HIGH (ref 70–99)
GLUCOSE UR QL: NEGATIVE — SIGNIFICANT CHANGE UP
HCT VFR BLD CALC: 29 % — LOW (ref 39–50)
HGB BLD-MCNC: 8.9 G/DL — LOW (ref 13–17)
INR BLD: 1.06 — SIGNIFICANT CHANGE UP (ref 0.88–1.16)
KETONES UR-MCNC: NEGATIVE — SIGNIFICANT CHANGE UP
LEUKOCYTE ESTERASE UR-ACNC: NEGATIVE — SIGNIFICANT CHANGE UP
LYMPHOCYTES # BLD AUTO: 6.1 % — LOW (ref 13–44)
MCHC RBC-ENTMCNC: 24.7 PG — LOW (ref 27–34)
MCHC RBC-ENTMCNC: 30.7 G/DL — LOW (ref 32–36)
MCV RBC AUTO: 80.6 FL — SIGNIFICANT CHANGE UP (ref 80–100)
MONOCYTES NFR BLD AUTO: 9.5 % — SIGNIFICANT CHANGE UP (ref 2–14)
NEUTROPHILS NFR BLD AUTO: 84.4 % — HIGH (ref 43–77)
NITRITE UR-MCNC: NEGATIVE — SIGNIFICANT CHANGE UP
PH UR: 5.5 — SIGNIFICANT CHANGE UP (ref 5–8)
PLATELET # BLD AUTO: 340 K/UL — SIGNIFICANT CHANGE UP (ref 150–400)
POTASSIUM SERPL-MCNC: 4 MMOL/L — SIGNIFICANT CHANGE UP (ref 3.5–5.3)
POTASSIUM SERPL-SCNC: 4 MMOL/L — SIGNIFICANT CHANGE UP (ref 3.5–5.3)
PROT SERPL-MCNC: 5.9 G/DL — LOW (ref 6–8.3)
PROT UR-MCNC: NEGATIVE MG/DL — SIGNIFICANT CHANGE UP
PROTHROM AB SERPL-ACNC: 11.8 SEC — SIGNIFICANT CHANGE UP (ref 9.8–12.7)
RBC # BLD: 3.6 M/UL — LOW (ref 4.2–5.8)
RBC # FLD: 18.8 % — HIGH (ref 10.3–16.9)
SODIUM SERPL-SCNC: 141 MMOL/L — SIGNIFICANT CHANGE UP (ref 135–145)
SP GR SPEC: >=1.03 — SIGNIFICANT CHANGE UP (ref 1–1.03)
UROBILINOGEN FLD QL: 0.2 E.U./DL — SIGNIFICANT CHANGE UP
WBC # BLD: 5.2 K/UL — SIGNIFICANT CHANGE UP (ref 3.8–10.5)
WBC # FLD AUTO: 5.2 K/UL — SIGNIFICANT CHANGE UP (ref 3.8–10.5)

## 2018-10-05 PROCEDURE — 99283 EMERGENCY DEPT VISIT LOW MDM: CPT

## 2018-10-05 PROCEDURE — 87086 URINE CULTURE/COLONY COUNT: CPT

## 2018-10-05 PROCEDURE — 99284 EMERGENCY DEPT VISIT MOD MDM: CPT

## 2018-10-05 PROCEDURE — 36415 COLL VENOUS BLD VENIPUNCTURE: CPT

## 2018-10-05 PROCEDURE — 85025 COMPLETE CBC W/AUTO DIFF WBC: CPT

## 2018-10-05 PROCEDURE — 85730 THROMBOPLASTIN TIME PARTIAL: CPT

## 2018-10-05 PROCEDURE — 81001 URINALYSIS AUTO W/SCOPE: CPT

## 2018-10-05 PROCEDURE — 80053 COMPREHEN METABOLIC PANEL: CPT

## 2018-10-05 PROCEDURE — 85610 PROTHROMBIN TIME: CPT

## 2018-10-05 RX ORDER — CEFUROXIME AXETIL 250 MG
1 TABLET ORAL
Qty: 14 | Refills: 0 | OUTPATIENT
Start: 2018-10-05 | End: 2018-10-11

## 2018-10-05 RX ORDER — CEFUROXIME AXETIL 250 MG
250 TABLET ORAL ONCE
Qty: 0 | Refills: 0 | Status: COMPLETED | OUTPATIENT
Start: 2018-10-05 | End: 2018-10-05

## 2018-10-05 RX ADMIN — Medication 250 MILLIGRAM(S): at 04:20

## 2018-10-05 NOTE — ED PROVIDER NOTE - OBJECTIVE STATEMENT
73M hx seizures, diverticulosis, c/o hematuria. pt states today noticed pink coloration to urine. states it typically clears to yellow at end of urination.  +dysuria. no abd pain. no flank pain. no n/v/.d no fevers.

## 2018-10-05 NOTE — ED PROVIDER NOTE - PROGRESS NOTE DETAILS
will treat bacteriuria, reocmmend f/u with urology  I have discussed the discharge plan with the patient. The patient agrees with the plan, as discussed.  The patient understands Emergency Department diagnosis is a preliminary diagnosis often based on limited information and that the patient must adhere to the follow-up plan as discussed.  The patient understands that if the symptoms worsen or if prescribed medications do not have the desired/planned effect that the patient may return to the Emergency Department at any time for further evaluation and treatment.

## 2018-10-06 LAB
CULTURE RESULTS: NO GROWTH — SIGNIFICANT CHANGE UP
SPECIMEN SOURCE: SIGNIFICANT CHANGE UP

## 2018-10-13 ENCOUNTER — EMERGENCY (EMERGENCY)
Facility: HOSPITAL | Age: 73
LOS: 1 days | Discharge: HOME | End: 2018-10-13
Attending: EMERGENCY MEDICINE | Admitting: EMERGENCY MEDICINE
Payer: MEDICARE

## 2018-10-13 VITALS
DIASTOLIC BLOOD PRESSURE: 89 MMHG | RESPIRATION RATE: 18 BRPM | HEART RATE: 101 BPM | SYSTOLIC BLOOD PRESSURE: 147 MMHG | TEMPERATURE: 98 F | OXYGEN SATURATION: 98 % | WEIGHT: 197.09 LBS

## 2018-10-13 PROBLEM — R56.9 UNSPECIFIED CONVULSIONS: Chronic | Status: ACTIVE | Noted: 2018-10-05

## 2018-10-13 PROCEDURE — 99282 EMERGENCY DEPT VISIT SF MDM: CPT

## 2018-10-13 RX ORDER — LEVETIRACETAM 250 MG/1
1 TABLET, FILM COATED ORAL
Qty: 0 | Refills: 0 | COMMUNITY

## 2018-10-13 NOTE — ED PROVIDER NOTE - PHYSICAL EXAMINATION
Constitutional: Well appearing, well nourished, awake, alert, oriented to person, place, time/situation and in no apparent distress.  ENMT: Airway patent. Normal MM  Eyes: Clear bilaterally  Cardiac: Normal rate, regular rhythm.  Heart sounds S1, S2.  No murmurs, rubs or gallops.  Respiratory: Breaths sounds equal and clear b/l. No increased WOB, tachypnea, hypoxia, or accessory mm use. Pt speaks in full sentences.   Musculoskeletal: Range of motion is not limited  Neuro: Alert and oriented x 3, face symmetric and speech fluent. Strength 5/5 x 4 ext and symmetric, nml gross motor movement, nml gait. No focal deficits noted.  Skin: Skin normal color for race, warm, dry and intact. No evidence of rash. small scattered papules around hair follicles w/o surrounding cellulitis / erythema / drainage, no pustules. no vesicles  Psych: Alert and oriented to person, place, time/situation. normal mood and affect. no apparent risk to self or others.

## 2018-10-13 NOTE — ED ADULT NURSE NOTE - EENT WDL
Eyes with no visual disturbances.  Ears clean and dry and no hearing difficulties. Nose with pink mucosa and no drainage.  Mouth mucous membranes moist and pink.  No tenderness or swelling to throat or neck. DISCHARGE

## 2018-10-13 NOTE — ED PROVIDER NOTE - OBJECTIVE STATEMENT
Pt w/ PMHx seizure d/o, RA, diverticulosis w/GI bleeding, anemia, p/w rash to back. Pt reports he has not visualized the area, but feels bumps on his back x 3 weeks. no pain. No f/c. No itching. No drainage. Pt has been placing Neosporin on it.

## 2018-10-17 DIAGNOSIS — Z79.899 OTHER LONG TERM (CURRENT) DRUG THERAPY: ICD-10-CM

## 2018-10-17 DIAGNOSIS — Z88.7 ALLERGY STATUS TO SERUM AND VACCINE: ICD-10-CM

## 2018-10-17 DIAGNOSIS — R21 RASH AND OTHER NONSPECIFIC SKIN ERUPTION: ICD-10-CM

## 2018-10-17 DIAGNOSIS — L73.9 FOLLICULAR DISORDER, UNSPECIFIED: ICD-10-CM

## 2018-10-18 ENCOUNTER — EMERGENCY (EMERGENCY)
Facility: HOSPITAL | Age: 73
LOS: 1 days | Discharge: HOME | End: 2018-10-18
Attending: EMERGENCY MEDICINE | Admitting: EMERGENCY MEDICINE
Payer: MEDICARE

## 2018-10-18 VITALS
OXYGEN SATURATION: 98 % | HEART RATE: 98 BPM | RESPIRATION RATE: 20 BRPM | WEIGHT: 195.11 LBS | TEMPERATURE: 98 F | SYSTOLIC BLOOD PRESSURE: 155 MMHG | DIASTOLIC BLOOD PRESSURE: 95 MMHG

## 2018-10-18 DIAGNOSIS — Z88.7 ALLERGY STATUS TO SERUM AND VACCINE: ICD-10-CM

## 2018-10-18 DIAGNOSIS — M54.5 LOW BACK PAIN: ICD-10-CM

## 2018-10-18 DIAGNOSIS — Z79.899 OTHER LONG TERM (CURRENT) DRUG THERAPY: ICD-10-CM

## 2018-10-18 PROCEDURE — 99282 EMERGENCY DEPT VISIT SF MDM: CPT

## 2018-10-18 NOTE — ED ADULT NURSE NOTE - NSIMPLEMENTINTERV_GEN_ALL_ED
Implemented All Universal Safety Interventions:  [place] to call system. Call bell, personal items and telephone within reach. Instruct patient to call for assistance. Room bathroom lighting operational. Non-slip footwear when patient is off stretcher. Physically safe environment: no spills, clutter or unnecessary equipment. Stretcher in lowest position, wheels locked, appropriate side rails in place.

## 2018-10-18 NOTE — ED ADULT NURSE NOTE - OBJECTIVE STATEMENT
pt c/o bilateral flank pain radiating down his thighs to his knees for several weeks.  no trauma.  no urinary difficulty.  pt also has a rash on his right upper back.

## 2018-10-18 NOTE — ED PROVIDER NOTE - ATTENDING CONTRIBUTION TO CARE
73M hx seizures, diverticulosis, c/o lower back pain.  states radiates to thighs. no incontinence. no numbness/weakness. no trauma  gen- nad  heent- ncat, clear conj  cv -rrr  lungs -ctab  abd - soft, nt, nd  ext -wwp, no edema  neuro -aox3, steady gait, sanchez  states pain resolved, no evidence of cord compression, no abd pain. no neuro deficits.  recommend PMD f/u

## 2018-10-18 NOTE — ED ADULT NURSE REASSESSMENT NOTE - NS ED NURSE REASSESS COMMENT FT1
pt reports pain being better.  pa made aware and pt will be discharged to f/u as out pt for MRI.  pt now angry and stating that he can't believe that MRI can not be done now.

## 2018-10-18 NOTE — ED PROVIDER NOTE - CHPI ED SYMPTOMS NEG
no tingling/no bladder dysfunction/no constipation/no difficulty bearing weight/no bowel dysfunction/no numbness/no motor function loss

## 2018-10-18 NOTE — ED PROVIDER NOTE - OBJECTIVE STATEMENT
Patient reports weeks of low back pain and radiation to thighs albeit denies trauma denies numbness denies bowel or bladder incontinence or retention.

## 2018-10-18 NOTE — ED PROVIDER NOTE - PHYSICAL EXAMINATION
gait steady without assist MS quads/ TA / EHL/ Gastrocnemius all 5/5 reflexes intact bilaterally at patella and achilles compartments soft pulses intact and equal bilaterally sensation intact to light touch

## 2018-10-18 NOTE — ED PROVIDER NOTE - MEDICAL DECISION MAKING DETAILS
discussed at length concerns at bilateral LE radiation though not acute etiology will require follow up  and likely advanced MRI imaging ( appreciate CTA recently during [facility] stay noted lumber DJD Discuss spinal stenosis as concerns though will need full med/ specialty work up --Patient shortly after arrival in ED stated pain had abated

## 2018-10-18 NOTE — ED ADULT TRIAGE NOTE - CHIEF COMPLAINT QUOTE
back pain radiating down b/l LE's X 2-3 weeks, also c/o intermittent pain to right hip. denies trauma or injury

## 2019-01-21 ENCOUNTER — APPOINTMENT (OUTPATIENT)
Dept: MRI IMAGING | Facility: HOSPITAL | Age: 74
End: 2019-01-21

## 2020-09-11 NOTE — H&P ADULT - PROBLEM SELECTOR PLAN 8
all other ROS negative except as per HPI 1) PCP reported as Dr. Mcneill on [facility], with possible number    Contacted on Admission: not contacted on overnight admission  2) Date of Contact with PCP:  3) PCP Contacted at Discharge: (Y/N)  4) Summary of Handoff Given to PCP:   5) Post-Discharge Appointment Date and Location: - Patient reports 6 years of keppra usage due to one syncopal episode. Unclear etiology of syncope  - Obtain collateral, continue home med in interim

## 2022-04-01 NOTE — PROGRESS NOTE ADULT - PROBLEM SELECTOR PLAN 10
Patient returned call   1) PCP: Dr. Mcneill on [facility], [phone]   Contacted on Admission: not contacted on overnight admission  2) Date of Contact with PCP: 7/16 called,  answered, will call again @ 2pm today  3) PCP Contacted at Discharge: (Y/N)  4) Summary of Handoff Given to PCP: TBA  5) Post-Discharge Appointment Date and Location: TBA 1) PCP: Dr. Gayathri Mora on [facility], [phone]  Contacted physician: Pt been on keppra for years for unspecified seizure disorder and prednisone for rheumatoid arthritis.   2) Date of Contact with PCP: 7/19  3) PCP Contacted at Discharge: (Y/N):   4) Summary of Handoff Given to PCP: Intern spoke to PCP and gave information regarding the status of Mr. Zimmerman.  5) Post-Discharge Appointment Date and Location: [facility]

## 2022-09-01 NOTE — ED ADULT TRIAGE NOTE - RESPIRATORY RATE (BREATHS/MIN)
Anesthesia Pre Eval Note    Anesthesia ROS/Med Hx        Anesthetic Complication History:  Patient does not have a history of anesthetic complications      Pulmonary Review:  Patient does not have a pulmonary history      Neuro/Psych Review:  Patient does not have a neuro/psych history       Cardiovascular Review:  Exercise tolerance: good (>4 METS)  Positive for hypertension    GI/HEPATIC/RENAL Review:  Patient does not have a GI/hepatic/renalhistory       End/Other Review:    Positive for obesity class I - 30.00 - 34.99  Positive for cancer  Additional Results:     ALLERGIES:  No Known Allergies       Last Labs        Component                Value               Date/Time                  WBC                      6.2                 09/01/2022 1422            RBC                      4.79                09/01/2022 1422            HGB                      14.0                09/01/2022 1422            HCT                      43.1                09/01/2022 1422            MCV                      90.0                09/01/2022 1422            MCH                      29.2                09/01/2022 1422            MCHC                     32.5                09/01/2022 1422            RDW-CV                   13.9                09/01/2022 1422            Sodium                   139                 07/28/2022 0935            Potassium                4.9                 07/28/2022 0935            Chloride                 104                 07/28/2022 0935            Carbon Dioxide           25                  07/28/2022 0935            Glucose                  116 (H)             07/28/2022 0935            BUN                      19                  07/28/2022 0935            Creatinine               1.05                07/28/2022 0935            Glomerular Filtrati*     72                  07/28/2022 0935            Calcium                  8.6                 07/28/2022 0935            PLT                       222                 09/01/2022 1422            INR                      1.0                 09/01/2022 1422        Past Medical History:  01/2022: Esophageal cancer (CMS/HCC)      Comment:  no chemo/radiation for this cancer, completed radiation                for prostate CA in 2/2022  No date: Essential (primary) hypertension  2011: History of transfusion  No date: Malignant neoplasm (CMS/HCC)  2022: Prostate cancer (CMS/HCC)      Comment:  radiation completed 2/2022    Past Surgical History:  No date: Esophagogastroduodenoscopy transoral flex diag  No date: Joint replacement; Bilateral      Comment:  hip       Prior to Admission medications :  Medication esomeprazole (NexIUM) 40 MG capsule, Sig Take 40 mg by mouth in the morning and 40 mg in the evening., Start Date , End Date , Taking? Yes, Authorizing Provider Outside Provider    Medication metoPROLOL succinate (TOPROL-XL) 50 MG 24 hr tablet, Sig Take 50 mg by mouth every morning., Start Date , End Date , Taking? Yes, Authorizing Provider Outside Provider    Medication metoPROLOL succinate (TOPROL-XL) 100 MG 24 hr tablet, Sig Take 1 tablet by mouth every morning., Start Date 7/15/22, End Date , Taking? Yes, Authorizing Provider Outside Provider    Medication acetaminophen (TYLENOL) 500 MG tablet, Sig Take 500 mg by mouth every 6 hours as needed for Pain., Start Date , End Date , Taking? Yes, Authorizing Provider Outside Provider         Patient Vitals in the past 24 hrs:  09/01/22 1433, BP:(!) 159/83, Temp:36.2 °C (97.2 °F), Temp src:Temporal, Pulse:84, Resp:18, SpO2:91 %, Height:5' 9\" (1.753 m), Weight:106.6 kg (235 lb)  09/01/22 1337, Height:5' 10\" (1.778 m), Weight:106.4 kg (234 lb 9.1 oz)      Relevant Problems   No relevant active problems       Physical Exam     Airway   Mallampati: II  TM Distance: >3 FB    Cardiovascular  Cardiovascular exam normal  Cardio Rhythm: Regular    Dental Exam  Dental exam normal    Pulmonary Exam  Pulmonary exam  normal    Abdominal Exam  Abdominal exam normal      Anesthesia Plan:    Phone call attempted:   ASA Status: 3  Anesthesia Type: MAC    Induction: Intravenous  Preferred Airway Type: MaskPatient does not have a difficult airway or is not at risk of aspiration.   Maintenance: TIVA  Premedication: None  Patient does not have an implantable electronic device requiring post procedure programming.     Post-op Pain Management: Per Surgeon      Checklist  Reviewed: Lab Results, EKG, Nursing Notes, Beta Blocker Status, DNR Status, Medications, Past Med History, Allergies, Patient Summary, Care Everywhere, Problem list, NPO Status, Outside Records and Consultations  Consent/Risks Discussed Statement:  The proposed anesthetic plan, including its risks and benefits, have been discussed with the Patient along with the risks and benefits of alternatives. Questions were encouraged and answered and the patient and/or representative understands and agrees to proceed.        I discussed with the patient (and/or patient's legal representative) the risks and benefits of the proposed anesthesia plan, MAC, which may include services performed by other anesthesia providers.    Alternative anesthesia plans, if available, were reviewed with the patient (and/or patient's legal representative). Discussion has been held with the patient (and/or patient's legal representative) regarding risks of anesthesia, which include allergic reaction, anxiety, aspiration, back pain, bleeding/hematoma, depressed breathing, emergence delirium, memory loss, intra-operative awareness, persistent pain, organ damage, oral injury, infection, dental injury, death, ICU admit, nerve injury, vomiting, sore throat, need for blood transfusion, hypotension, conversion to general anesthesia, headache, eye injury, nausea and post-op intubation and emergent situations that may require change in anesthesia plan.    The patient (and/or patient's legal representative) has  indicated understanding, his/her questions have been answered, and he/she wishes to proceed with the planned anesthetic.    Blood Products: Not Anticipated     20

## 2023-05-22 NOTE — ED ADULT TRIAGE NOTE - CCCP TRG CHIEF CMPLNT
Airway  Urgency: elective    Date/Time: 5/22/2023 10:05 AM  Airway not difficult    General Information and Staff    Patient location during procedure: OR  Anesthesiologist: Forrest Hawley MD  CRNA/CAA: Nisha Conti CRNA    Indications and Patient Condition  Indications for airway management: airway protection    Preoxygenated: yes  Mask difficulty assessment: 0 - not attempted    Final Airway Details  Final airway type: supraglottic airway      Successful airway: classic  Size 4     Number of attempts at approach: 1  Assessment: lips, teeth, and gum same as pre-op    Additional Comments  LMA placed with no trauma noted. Patient tolerated well. Good seal. Secured.         hematuria